# Patient Record
Sex: MALE | Race: WHITE | Employment: OTHER | URBAN - METROPOLITAN AREA
[De-identification: names, ages, dates, MRNs, and addresses within clinical notes are randomized per-mention and may not be internally consistent; named-entity substitution may affect disease eponyms.]

---

## 2017-01-04 ENCOUNTER — OFFICE VISIT (OUTPATIENT)
Dept: FAMILY MEDICINE CLINIC | Facility: CLINIC | Age: 42
End: 2017-01-04

## 2017-01-04 VITALS
HEART RATE: 78 BPM | WEIGHT: 265 LBS | SYSTOLIC BLOOD PRESSURE: 134 MMHG | RESPIRATION RATE: 20 BRPM | TEMPERATURE: 97 F | BODY MASS INDEX: 37.1 KG/M2 | HEIGHT: 71 IN | DIASTOLIC BLOOD PRESSURE: 78 MMHG

## 2017-01-04 DIAGNOSIS — J01.10 ACUTE FRONTAL SINUSITIS, RECURRENCE NOT SPECIFIED: Primary | ICD-10-CM

## 2017-01-04 DIAGNOSIS — K21.9 GASTROESOPHAGEAL REFLUX DISEASE, ESOPHAGITIS PRESENCE NOT SPECIFIED: ICD-10-CM

## 2017-01-04 PROCEDURE — 99214 OFFICE O/P EST MOD 30 MIN: CPT | Performed by: FAMILY MEDICINE

## 2017-01-04 RX ORDER — ESOMEPRAZOLE MAGNESIUM 40 MG/1
40 CAPSULE, DELAYED RELEASE ORAL 2 TIMES DAILY
Qty: 180 CAPSULE | Refills: 1 | Status: SHIPPED | OUTPATIENT
Start: 2017-01-04 | End: 2017-06-08

## 2017-01-04 RX ORDER — AZITHROMYCIN 250 MG/1
TABLET, FILM COATED ORAL
Qty: 6 TABLET | Refills: 0 | Status: SHIPPED | OUTPATIENT
Start: 2017-01-04 | End: 2017-01-30

## 2017-01-04 NOTE — PROGRESS NOTES
CC:  Raul Donato is a 37year old male here for Patient presents with:  Weakness  Dizziness  Sore Throat  Nasal Congestion      HPI:     URI  -started 2-3 wks ago  -associated with weakness, sore throat, nasal congestion  -previous treatment: only to 2 (two) times daily.  Disp: 100 each Rfl: 2       Allergies:    Adhesive Tape (Sharon*    Other (See Comments)    Comment:blistering  Allegra [Fexofenadi*      Benadryl [Diphenhyd*        Comment:Hives/itchy/vomiting/dizzy  Bentyl [Dicyclomine]      Carafate L.Leg          Past Surgical History    TONSILLECTOMY      COLONOSCOPY  11/13    Comment poor prep    COLONOSCOPY  11/19/2013    Comment Procedure: COLONOSCOPY;  Surgeon: Rusty Vazquez MD;  Location:  ENDOSCOPY    REMOVAL OF TONSILS,13+ Y/O      IR Release 180 capsule 1      Sig: Take 1 capsule (40 mg total) by mouth 2 (two) times daily. azithromycin 250 MG Oral Tab 6 tablet 0      Sig: Take two tablets by mouth today, then one daily.          The patient indicates understanding of these issues a

## 2017-01-04 NOTE — PATIENT INSTRUCTIONS
-- start azithromycin as prescribed  --take with food  -- lots of fluids and rest  -- continue to try to cut back on smoking  -- followup in 1 month, sooner if needed

## 2017-01-15 ENCOUNTER — HOSPITAL ENCOUNTER (EMERGENCY)
Age: 42
Discharge: HOME | End: 2017-01-15
Attending: EMERGENCY MEDICINE
Payer: MEDICARE

## 2017-01-15 ENCOUNTER — APPOINTMENT (OUTPATIENT)
Dept: GENERAL RADIOLOGY | Age: 42
End: 2017-01-15
Attending: EMERGENCY MEDICINE
Payer: MEDICARE

## 2017-01-15 VITALS
RESPIRATION RATE: 18 BRPM | DIASTOLIC BLOOD PRESSURE: 80 MMHG | TEMPERATURE: 98 F | BODY MASS INDEX: 33.86 KG/M2 | OXYGEN SATURATION: 98 % | HEART RATE: 88 BPM | WEIGHT: 250 LBS | SYSTOLIC BLOOD PRESSURE: 148 MMHG | HEIGHT: 72 IN

## 2017-01-15 DIAGNOSIS — E86.0 DEHYDRATION: Primary | ICD-10-CM

## 2017-01-15 DIAGNOSIS — B34.9 VIRAL SYNDROME: ICD-10-CM

## 2017-01-15 LAB
ALBUMIN SERPL-MCNC: 4 G/DL (ref 3.5–4.8)
ALP LIVER SERPL-CCNC: 56 U/L (ref 45–117)
ALT SERPL-CCNC: 29 U/L (ref 17–63)
AST SERPL-CCNC: 19 U/L (ref 15–41)
BASOPHILS # BLD AUTO: 0.08 X10(3) UL (ref 0–0.1)
BASOPHILS NFR BLD AUTO: 0.8 %
BILIRUB SERPL-MCNC: 0.6 MG/DL (ref 0.1–2)
BUN BLD-MCNC: 10 MG/DL (ref 8–20)
CALCIUM BLD-MCNC: 8.5 MG/DL (ref 8.3–10.3)
CHLORIDE: 103 MMOL/L (ref 101–111)
CLARITY UR REFRACT.AUTO: CLEAR
CO2: 26 MMOL/L (ref 22–32)
COLOR UR AUTO: YELLOW
CREAT BLD-MCNC: 1 MG/DL (ref 0.7–1.3)
EOSINOPHIL # BLD AUTO: 0.05 X10(3) UL (ref 0–0.3)
EOSINOPHIL NFR BLD AUTO: 0.5 %
ERYTHROCYTE [DISTWIDTH] IN BLOOD BY AUTOMATED COUNT: 12.9 % (ref 11.5–16)
GLUCOSE BLD-MCNC: 188 MG/DL (ref 70–99)
GLUCOSE UR STRIP.AUTO-MCNC: 100 MG/DL
HCT VFR BLD AUTO: 43.3 % (ref 37–53)
HGB BLD-MCNC: 15.3 G/DL (ref 13–17)
IMMATURE GRANULOCYTE COUNT: 0.06 X10(3) UL (ref 0–1)
IMMATURE GRANULOCYTE RATIO %: 0.6 %
KETONES UR STRIP.AUTO-MCNC: 15 MG/DL
LEUKOCYTE ESTERASE UR QL STRIP.AUTO: NEGATIVE
LYMPHOCYTES # BLD AUTO: 2.11 X10(3) UL (ref 0.9–4)
LYMPHOCYTES NFR BLD AUTO: 20.2 %
M PROTEIN MFR SERPL ELPH: 6.9 G/DL (ref 6.1–8.3)
MCH RBC QN AUTO: 31.2 PG (ref 27–33.2)
MCHC RBC AUTO-ENTMCNC: 35.3 G/DL (ref 31–37)
MCV RBC AUTO: 88.4 FL (ref 80–99)
MONOCYTES # BLD AUTO: 1.18 X10(3) UL (ref 0.1–0.6)
MONOCYTES NFR BLD AUTO: 11.3 %
NEUTROPHIL ABS PRELIM: 6.99 X10 (3) UL (ref 1.3–6.7)
NEUTROPHILS # BLD AUTO: 6.99 X10(3) UL (ref 1.3–6.7)
NEUTROPHILS NFR BLD AUTO: 66.6 %
NITRITE UR QL STRIP.AUTO: NEGATIVE
PH UR STRIP.AUTO: 6 [PH] (ref 4.5–8)
PLATELET # BLD AUTO: 254 10(3)UL (ref 150–450)
POTASSIUM SERPL-SCNC: 4.2 MMOL/L (ref 3.6–5.1)
RBC # BLD AUTO: 4.9 X10(6)UL (ref 4.3–5.7)
RBC UR QL AUTO: NEGATIVE
RED CELL DISTRIBUTION WIDTH-SD: 41.6 FL (ref 35.1–46.3)
SODIUM SERPL-SCNC: 139 MMOL/L (ref 136–144)
SP GR UR STRIP.AUTO: >=1.03 (ref 1–1.03)
UROBILINOGEN UR STRIP.AUTO-MCNC: 1 MG/DL
WBC # BLD AUTO: 10.5 X10(3) UL (ref 4–13)

## 2017-01-15 PROCEDURE — 80053 COMPREHEN METABOLIC PANEL: CPT | Performed by: EMERGENCY MEDICINE

## 2017-01-15 PROCEDURE — 81001 URINALYSIS AUTO W/SCOPE: CPT | Performed by: EMERGENCY MEDICINE

## 2017-01-15 PROCEDURE — 99285 EMERGENCY DEPT VISIT HI MDM: CPT

## 2017-01-15 PROCEDURE — 85025 COMPLETE CBC W/AUTO DIFF WBC: CPT | Performed by: EMERGENCY MEDICINE

## 2017-01-15 PROCEDURE — 96361 HYDRATE IV INFUSION ADD-ON: CPT

## 2017-01-15 PROCEDURE — 93010 ELECTROCARDIOGRAM REPORT: CPT

## 2017-01-15 PROCEDURE — 71020 XR CHEST PA + LAT CHEST (CPT=71020): CPT

## 2017-01-15 PROCEDURE — 96360 HYDRATION IV INFUSION INIT: CPT

## 2017-01-15 PROCEDURE — 93005 ELECTROCARDIOGRAM TRACING: CPT

## 2017-01-15 PROCEDURE — 99284 EMERGENCY DEPT VISIT MOD MDM: CPT

## 2017-01-15 RX ORDER — IPRATROPIUM BROMIDE AND ALBUTEROL SULFATE 2.5; .5 MG/3ML; MG/3ML
3 SOLUTION RESPIRATORY (INHALATION) ONCE
Status: COMPLETED | OUTPATIENT
Start: 2017-01-15 | End: 2017-01-15

## 2017-01-15 NOTE — ED PROVIDER NOTES
Patient Seen in: THE [facility] Emergency Department In [place]    History   Patient presents with:  Dizziness (neurologic)  Abdomen/Flank Pain (GI/)    Stated Complaint: dizziness, abdominal pain    HPI    Patient presents with weakness and dizziness.   The FILTER PLACEMENT      GASTRO - DMG  4/14    Comment normal    GASTRO - DMG  5/14    Comment large amount of food in stomach    OTHER      Comment open knee surgery left knee    ANESTH,SURGERY SHOULDER BONE,OPEN      CATH DRUG ELUTING STENT      Comment lef Status: Former User                         Types: Snuff    Alcohol Use: Yes           0.0 oz/week       0 Standard drinks or equivalent per week       Comment: rare      Review of Systems    Positive for stated complaint: dizziness, abdominal pain  Other DIFFERENTIAL - Abnormal; Notable for the following:     Neutrophil Absolute Prelim 6.99 (*)     Neutrophil Absolute 6.99 (*)     Monocyte Absolute 1.18 (*)     All other components within normal limits   CBC WITH DIFFERENTIAL WITH PLATELET    Narrative: 1745)     Patient was given a total of 2 L of normal saline. He did appear to be very dehydrated and his urine was quite concentrated. Feels somewhat better but is still weak. He was given a nebulizer treatment without much change in his symptoms.   He h

## 2017-01-15 NOTE — ED INITIAL ASSESSMENT (HPI)
Pt with dizziness for three weeks,states has been on two different antibiotics for uri's, chronic abdominal pain.

## 2017-01-17 ENCOUNTER — OFFICE VISIT (OUTPATIENT)
Dept: FAMILY MEDICINE CLINIC | Facility: CLINIC | Age: 42
End: 2017-01-17

## 2017-01-17 VITALS
HEART RATE: 66 BPM | HEIGHT: 71 IN | RESPIRATION RATE: 18 BRPM | DIASTOLIC BLOOD PRESSURE: 84 MMHG | WEIGHT: 263 LBS | BODY MASS INDEX: 36.82 KG/M2 | SYSTOLIC BLOOD PRESSURE: 132 MMHG

## 2017-01-17 DIAGNOSIS — R19.7 DIARRHEA OF PRESUMED INFECTIOUS ORIGIN: Primary | ICD-10-CM

## 2017-01-17 DIAGNOSIS — E86.0 DEHYDRATION: ICD-10-CM

## 2017-01-17 LAB
ATRIAL RATE: 73 BPM
P AXIS: 11 DEGREES
P-R INTERVAL: 196 MS
Q-T INTERVAL: 402 MS
QRS DURATION: 96 MS
QTC CALCULATION (BEZET): 442 MS
R AXIS: 31 DEGREES
T AXIS: 7 DEGREES
VENTRICULAR RATE: 73 BPM

## 2017-01-17 PROCEDURE — 99214 OFFICE O/P EST MOD 30 MIN: CPT | Performed by: FAMILY MEDICINE

## 2017-01-17 NOTE — PATIENT INSTRUCTIONS
-- start yogurt 1-2x/day  -- keep up with fluids as much as possible  -- natural gregoria can help with nausea  -- muscle cramps should improve with better hydration  -- if diarrhea not improving in next couple of days, bring in stool sample

## 2017-01-17 NOTE — PROGRESS NOTES
CC:  Chema Harper is a 37year old male here for Patient presents with:  ER F/U: 01/15/2017 the patient was in the ER for dehydration and viral syndrome   Diarrhea      HPI:     Here for ER followup - was treated for dehydration with IV fluids in sett tamsulosin HCl (FLOMAX) 0.4 MG Oral Cap nightly. Disp:  Rfl: 1   Albuterol Sulfate HFA (VENTOLIN) 108 (90 BASE) MCG/ACT Inhalation Aero Soln Inhale 2 puffs into the lungs every 6 (six) hours as needed for Wheezing.  Disp: 1 Inhaler Rfl: 1   Lancets Does Anesthesia complication      wakes up during surgery - x 2   • Back pain    • History of blood clots    • Other and unspecified hyperlipidemia    • Esophageal reflux    • Rheumatoid arthritis (HCC)    • DDD (degenerative disc disease), lumbar    • Deep vei for now  -gregoria prn nausea  -if diarrhea not resolving in next couple of days, return stool sample for c diff (kit given)  - followup if not improving or worsening  -otherwise, plan to followup in 1 month    Orders This Visit:    Orders Placed This Encoun

## 2017-01-19 ENCOUNTER — APPOINTMENT (OUTPATIENT)
Dept: LAB | Age: 42
End: 2017-01-19
Attending: FAMILY MEDICINE
Payer: MEDICARE

## 2017-01-19 DIAGNOSIS — R19.7 DIARRHEA OF PRESUMED INFECTIOUS ORIGIN: ICD-10-CM

## 2017-01-19 PROCEDURE — 87493 C DIFF AMPLIFIED PROBE: CPT

## 2017-01-20 ENCOUNTER — TELEPHONE (OUTPATIENT)
Dept: FAMILY MEDICINE CLINIC | Facility: CLINIC | Age: 42
End: 2017-01-20

## 2017-01-20 NOTE — TELEPHONE ENCOUNTER
----- Message from Lakia Mi MD sent at 1/20/2017 11:58 AM CST -----  c diff negative - keep up with fluids, diarrhea should resolve in next couple of days  -followup early next week if issues

## 2017-01-20 NOTE — PROGRESS NOTES
Quick Note:    c diff negative - keep up with fluids, diarrhea should resolve in next couple of days  -followup early next week if issues  ______

## 2017-01-20 NOTE — TELEPHONE ENCOUNTER
lmom for pt with results/instructions. Asked pt after reviewing message to call office with any questions.

## 2017-01-20 NOTE — TELEPHONE ENCOUNTER
Pt returned call. He didn't listen to his message. Ok to relay per Rahul. Pt understood to call back next week if he's not better.

## 2017-01-30 ENCOUNTER — OFFICE VISIT (OUTPATIENT)
Dept: FAMILY MEDICINE CLINIC | Facility: CLINIC | Age: 42
End: 2017-01-30

## 2017-01-30 VITALS
HEIGHT: 71 IN | TEMPERATURE: 97 F | SYSTOLIC BLOOD PRESSURE: 136 MMHG | WEIGHT: 266 LBS | DIASTOLIC BLOOD PRESSURE: 72 MMHG | RESPIRATION RATE: 18 BRPM | BODY MASS INDEX: 37.24 KG/M2 | HEART RATE: 78 BPM

## 2017-01-30 DIAGNOSIS — E11.65 TYPE 2 DIABETES MELLITUS WITH HYPERGLYCEMIA, WITH LONG-TERM CURRENT USE OF INSULIN (HCC): ICD-10-CM

## 2017-01-30 DIAGNOSIS — R53.1 GENERALIZED WEAKNESS: Primary | ICD-10-CM

## 2017-01-30 DIAGNOSIS — R63.0 DECREASED APPETITE: ICD-10-CM

## 2017-01-30 DIAGNOSIS — Z79.4 TYPE 2 DIABETES MELLITUS WITH HYPERGLYCEMIA, WITH LONG-TERM CURRENT USE OF INSULIN (HCC): ICD-10-CM

## 2017-01-30 PROCEDURE — 99214 OFFICE O/P EST MOD 30 MIN: CPT | Performed by: FAMILY MEDICINE

## 2017-01-30 NOTE — PROGRESS NOTES
Lisa Hannon is a 37year old male here for Patient presents with:  Lightheadedness  Weakness  Swelling: hands, ankles, neck  Cough      HPI:     Generalized weakness  -continues to feel weak, lightheaded, decreased appetite  -about 2 wks ago, he decr UPPER GI ENDO NO BARRETTS  5/15    Comment food in stomach    VASCULAR SURGERY      OTHER SURGICAL HISTORY      Comment Head surgery/birthmark    OTHER SURGICAL HISTORY      Comment Knee/scope    OTHER SURGICAL HISTORY      Comment Neck surgery.  Lymph node Lancets Does not apply Misc Inject 1 each into the skin 2 (two) times daily.  Disp: 100 each Rfl: 2       Allergies:    Adhesive Tape (Sharon*    Other (See Comments)    Comment:blistering  Allegra [Fexofenadi*      Benadryl [Diphenhyd*        Comment:Hives get him through until next refill          1/30/2017  Vivian Toth MD

## 2017-01-30 NOTE — PATIENT INSTRUCTIONS
-- increase nexium to 2x/day  -- ok for robitussin as needed  -- honey can help with congestion and cough as well  -- consider starting job (will have to push through the lack of energy for first couple of weeks, but your body will get better)  -- restart

## 2017-02-04 ENCOUNTER — APPOINTMENT (OUTPATIENT)
Dept: GENERAL RADIOLOGY | Age: 42
End: 2017-02-04
Attending: EMERGENCY MEDICINE
Payer: MEDICARE

## 2017-02-04 ENCOUNTER — HOSPITAL ENCOUNTER (EMERGENCY)
Age: 42
Discharge: HOME | End: 2017-02-04
Attending: EMERGENCY MEDICINE
Payer: MEDICARE

## 2017-02-04 VITALS
TEMPERATURE: 99 F | WEIGHT: 250 LBS | OXYGEN SATURATION: 96 % | SYSTOLIC BLOOD PRESSURE: 137 MMHG | DIASTOLIC BLOOD PRESSURE: 66 MMHG | HEART RATE: 64 BPM | RESPIRATION RATE: 14 BRPM | HEIGHT: 72 IN | BODY MASS INDEX: 33.86 KG/M2

## 2017-02-04 DIAGNOSIS — J02.9 ACUTE VIRAL PHARYNGITIS: Primary | ICD-10-CM

## 2017-02-04 DIAGNOSIS — J06.9 UPPER RESPIRATORY TRACT INFECTION, UNSPECIFIED TYPE: ICD-10-CM

## 2017-02-04 DIAGNOSIS — S40.012A CONTUSION OF LEFT SHOULDER, INITIAL ENCOUNTER: ICD-10-CM

## 2017-02-04 LAB
ALBUMIN SERPL-MCNC: 3.8 G/DL (ref 3.5–4.8)
ALP LIVER SERPL-CCNC: 56 U/L (ref 45–117)
ALT SERPL-CCNC: 32 U/L (ref 17–63)
AST SERPL-CCNC: 17 U/L (ref 15–41)
BASOPHILS # BLD AUTO: 0.07 X10(3) UL (ref 0–0.1)
BASOPHILS NFR BLD AUTO: 0.8 %
BILIRUB SERPL-MCNC: 0.5 MG/DL (ref 0.1–2)
BUN BLD-MCNC: 9 MG/DL (ref 8–20)
CALCIUM BLD-MCNC: 8.7 MG/DL (ref 8.3–10.3)
CHLORIDE: 104 MMOL/L (ref 101–111)
CO2: 24 MMOL/L (ref 22–32)
CREAT BLD-MCNC: 0.95 MG/DL (ref 0.7–1.3)
EOSINOPHIL # BLD AUTO: 0.09 X10(3) UL (ref 0–0.3)
EOSINOPHIL NFR BLD AUTO: 1 %
ERYTHROCYTE [DISTWIDTH] IN BLOOD BY AUTOMATED COUNT: 12.9 % (ref 11.5–16)
GLUCOSE BLD-MCNC: 199 MG/DL (ref 70–99)
HCT VFR BLD AUTO: 40.5 % (ref 37–53)
HGB BLD-MCNC: 14.8 G/DL (ref 13–17)
IMMATURE GRANULOCYTE COUNT: 0.06 X10(3) UL (ref 0–1)
IMMATURE GRANULOCYTE RATIO %: 0.7 %
LYMPHOCYTES # BLD AUTO: 2.28 X10(3) UL (ref 0.9–4)
LYMPHOCYTES NFR BLD AUTO: 26.5 %
M PROTEIN MFR SERPL ELPH: 6.5 G/DL (ref 6.1–8.3)
MCH RBC QN AUTO: 31.9 PG (ref 27–33.2)
MCHC RBC AUTO-ENTMCNC: 36.5 G/DL (ref 31–37)
MCV RBC AUTO: 87.3 FL (ref 80–99)
MONOCYTES # BLD AUTO: 0.85 X10(3) UL (ref 0.1–0.6)
MONOCYTES NFR BLD AUTO: 9.9 %
MONOSCREEN: NEGATIVE
NEUTROPHIL ABS PRELIM: 5.27 X10 (3) UL (ref 1.3–6.7)
NEUTROPHILS # BLD AUTO: 5.27 X10(3) UL (ref 1.3–6.7)
NEUTROPHILS NFR BLD AUTO: 61.1 %
PLATELET # BLD AUTO: 197 10(3)UL (ref 150–450)
POTASSIUM SERPL-SCNC: 3.7 MMOL/L (ref 3.6–5.1)
RBC # BLD AUTO: 4.64 X10(6)UL (ref 4.3–5.7)
RED CELL DISTRIBUTION WIDTH-SD: 40.4 FL (ref 35.1–46.3)
SODIUM SERPL-SCNC: 139 MMOL/L (ref 136–144)
WBC # BLD AUTO: 8.6 X10(3) UL (ref 4–13)

## 2017-02-04 PROCEDURE — 87081 CULTURE SCREEN ONLY: CPT | Performed by: EMERGENCY MEDICINE

## 2017-02-04 PROCEDURE — 87430 STREP A AG IA: CPT | Performed by: EMERGENCY MEDICINE

## 2017-02-04 PROCEDURE — 73030 X-RAY EXAM OF SHOULDER: CPT

## 2017-02-04 PROCEDURE — 71020 XR CHEST PA + LAT CHEST (CPT=71020): CPT

## 2017-02-04 PROCEDURE — 96360 HYDRATION IV INFUSION INIT: CPT

## 2017-02-04 PROCEDURE — 99284 EMERGENCY DEPT VISIT MOD MDM: CPT

## 2017-02-04 PROCEDURE — 86403 PARTICLE AGGLUT ANTBDY SCRN: CPT | Performed by: EMERGENCY MEDICINE

## 2017-02-04 PROCEDURE — 80053 COMPREHEN METABOLIC PANEL: CPT | Performed by: EMERGENCY MEDICINE

## 2017-02-04 PROCEDURE — 85025 COMPLETE CBC W/AUTO DIFF WBC: CPT | Performed by: EMERGENCY MEDICINE

## 2017-02-04 RX ORDER — SODIUM CHLORIDE 9 MG/ML
INJECTION, SOLUTION INTRAVENOUS ONCE
Status: COMPLETED | OUTPATIENT
Start: 2017-02-04 | End: 2017-02-04

## 2017-02-05 NOTE — ED PROVIDER NOTES
Patient Seen in: THE [facility] Emergency Department In [place]    History   Patient presents with:  Sore Throat    Stated Complaint: sore throat    HPI    Patient is a 21-year-old male who presents emergency room with a history of multiple complaints.   The p • Rheumatoid arthritis ([facility] [address]    • DDD (degenerative disc disease), lumbar    • Deep vein thrombosis ([facility] [address] \"Years ago\"     L.Leg           Past Surgical History    TONSILLECTOMY      COLONOSCOPY  11/13    Comment poor prep    COLONOSCOPY  11/19/2013 Onset   • Diabetes Mother      DM   • Other[other] [OTHER] Mother    • Heart Disorder Father    • other[other] [OTHER] Brother      back problems    • Breast Cancer Maternal Grandmother          Smoking Status: Current Every Day Smoker        Packs/Day: 0. no guarding, no rebound, no mass, and no organomegaly appreciated. There is normoactive bowel sounds. There is no hernia. EXTREMITIES: There is no cyanosis, clubbing, or edema appreciated. Pulses are 2+ and equal in all 4 extremities.   There is mild tende no other evidence of abnormalities noted. Liver function tests are unremarkable. Patient's Monospot is negative. Patient strep test negative. Patient was given a liter of IV fluid in the emergency room.   Patient had no further new complaints throughout

## 2017-02-20 ENCOUNTER — APPOINTMENT (OUTPATIENT)
Dept: LAB | Age: 42
End: 2017-02-20
Attending: FAMILY MEDICINE
Payer: MEDICARE

## 2017-02-20 ENCOUNTER — OFFICE VISIT (OUTPATIENT)
Dept: FAMILY MEDICINE CLINIC | Facility: CLINIC | Age: 42
End: 2017-02-20

## 2017-02-20 VITALS
DIASTOLIC BLOOD PRESSURE: 82 MMHG | OXYGEN SATURATION: 97 % | RESPIRATION RATE: 22 BRPM | WEIGHT: 270 LBS | HEART RATE: 75 BPM | BODY MASS INDEX: 37.8 KG/M2 | HEIGHT: 71 IN | SYSTOLIC BLOOD PRESSURE: 120 MMHG | TEMPERATURE: 97 F

## 2017-02-20 DIAGNOSIS — G89.29 CHRONIC ABDOMINAL PAIN: ICD-10-CM

## 2017-02-20 DIAGNOSIS — K59.09 OTHER CONSTIPATION: ICD-10-CM

## 2017-02-20 DIAGNOSIS — J00 ACUTE NASOPHARYNGITIS: ICD-10-CM

## 2017-02-20 DIAGNOSIS — Z79.4 TYPE 2 DIABETES MELLITUS WITH HYPERGLYCEMIA, WITH LONG-TERM CURRENT USE OF INSULIN (HCC): ICD-10-CM

## 2017-02-20 DIAGNOSIS — R10.9 CHRONIC ABDOMINAL PAIN: ICD-10-CM

## 2017-02-20 DIAGNOSIS — E11.65 TYPE 2 DIABETES MELLITUS WITH HYPERGLYCEMIA, WITH LONG-TERM CURRENT USE OF INSULIN (HCC): ICD-10-CM

## 2017-02-20 DIAGNOSIS — Z79.4 TYPE 2 DIABETES MELLITUS WITH HYPERGLYCEMIA, WITH LONG-TERM CURRENT USE OF INSULIN (HCC): Primary | ICD-10-CM

## 2017-02-20 DIAGNOSIS — E11.65 TYPE 2 DIABETES MELLITUS WITH HYPERGLYCEMIA, WITH LONG-TERM CURRENT USE OF INSULIN (HCC): Primary | ICD-10-CM

## 2017-02-20 DIAGNOSIS — K21.9 GASTROESOPHAGEAL REFLUX DISEASE, ESOPHAGITIS PRESENCE NOT SPECIFIED: ICD-10-CM

## 2017-02-20 DIAGNOSIS — J02.9 SORE THROAT: ICD-10-CM

## 2017-02-20 PROCEDURE — 80053 COMPREHEN METABOLIC PANEL: CPT

## 2017-02-20 PROCEDURE — 83036 HEMOGLOBIN GLYCOSYLATED A1C: CPT

## 2017-02-20 PROCEDURE — 36415 COLL VENOUS BLD VENIPUNCTURE: CPT

## 2017-02-20 PROCEDURE — 99214 OFFICE O/P EST MOD 30 MIN: CPT | Performed by: FAMILY MEDICINE

## 2017-02-20 NOTE — PROGRESS NOTES
Fartun Briones is a 37year old male here for Patient presents with:  Sore Throat: x 1 week  Abdominal Pain: x 1 week related to GERD  Shortness Of Breath      HPI:     Abdominal Pain  -continues to have chronic abdominal pain with the feeling of acid i food in stomach    VASCULAR SURGERY      OTHER SURGICAL HISTORY      Comment Head surgery/birthmark    OTHER SURGICAL HISTORY      Comment Knee/scope    OTHER SURGICAL HISTORY      Comment Neck surgery.  Lymph node removed in his 20's       Family History into the skin 2 (two) times daily. Disp: 100 each Rfl: 2   Albuterol Sulfate HFA (VENTOLIN) 108 (90 BASE) MCG/ACT Inhalation Aero Soln Inhale 2 puffs into the lungs every 6 (six) hours as needed for Wheezing.  Disp: 1 Inhaler Rfl: 1       Allergies:    Adhe of steroids  -f/u in 1 wk to reassess    DM  -recheck labs   -patient  Declines urine testing today       The patient is asked to return in 1 wk.     Orders This Visit:    Orders Placed This Encounter  Hemoglobin A1C [E]  Comp Metabolic Panel (14) [E]    2/

## 2017-02-20 NOTE — PATIENT INSTRUCTIONS
-- go to lab for bloodwork  -- humidifer, fluids, rest, warm water with honey for throat  -- try to cut back on smoking  -- followup next week  -- start linzess once daily and if no effect, can increase to 2 tabs at the same time daily  -- continue to push

## 2017-02-21 LAB
ALBUMIN SERPL-MCNC: 3.8 G/DL (ref 3.5–4.8)
ALP LIVER SERPL-CCNC: 69 U/L (ref 45–117)
ALT SERPL-CCNC: 34 U/L (ref 17–63)
AST SERPL-CCNC: 22 U/L (ref 15–41)
BILIRUB SERPL-MCNC: 0.6 MG/DL (ref 0.1–2)
BUN BLD-MCNC: 15 MG/DL (ref 8–20)
CALCIUM BLD-MCNC: 9.3 MG/DL (ref 8.3–10.3)
CHLORIDE: 101 MMOL/L (ref 101–111)
CO2: 27 MMOL/L (ref 22–32)
CREAT BLD-MCNC: 0.99 MG/DL (ref 0.7–1.3)
EST. AVERAGE GLUCOSE BLD GHB EST-MCNC: 197 MG/DL (ref 68–126)
GLUCOSE BLD-MCNC: 260 MG/DL (ref 70–99)
HBA1C MFR BLD HPLC: 8.5 % (ref ?–5.7)
M PROTEIN MFR SERPL ELPH: 6.3 G/DL (ref 6.1–8.3)
POTASSIUM SERPL-SCNC: 4.2 MMOL/L (ref 3.6–5.1)
SODIUM SERPL-SCNC: 137 MMOL/L (ref 136–144)

## 2017-02-27 ENCOUNTER — TELEPHONE (OUTPATIENT)
Dept: FAMILY MEDICINE CLINIC | Facility: CLINIC | Age: 42
End: 2017-02-27

## 2017-02-27 RX ORDER — PREDNISONE 5 MG/ML
20 SOLUTION ORAL DAILY
Qty: 100 ML | Refills: 0 | Status: SHIPPED | OUTPATIENT
Start: 2017-02-27 | End: 2017-03-04

## 2017-02-27 NOTE — TELEPHONE ENCOUNTER
Patient called  -not feeling better  -continues to have throat pain and coughing  -has been on several rounds of antibiotics  -will try liquid prednisone 20mg daily x 5 days  -sugars high - will also increase metformin to 2500mg daily  -f/u later this week

## 2017-03-07 ENCOUNTER — OFFICE VISIT (OUTPATIENT)
Dept: FAMILY MEDICINE CLINIC | Facility: CLINIC | Age: 42
End: 2017-03-07

## 2017-03-07 VITALS
RESPIRATION RATE: 18 BRPM | BODY MASS INDEX: 37.52 KG/M2 | SYSTOLIC BLOOD PRESSURE: 144 MMHG | WEIGHT: 268 LBS | DIASTOLIC BLOOD PRESSURE: 90 MMHG | HEART RATE: 88 BPM | HEIGHT: 71 IN | TEMPERATURE: 98 F

## 2017-03-07 DIAGNOSIS — Z79.4 TYPE 2 DIABETES MELLITUS WITH OTHER ORAL COMPLICATION, WITH LONG-TERM CURRENT USE OF INSULIN (HCC): Primary | ICD-10-CM

## 2017-03-07 DIAGNOSIS — E11.638 TYPE 2 DIABETES MELLITUS WITH OTHER ORAL COMPLICATION, WITH LONG-TERM CURRENT USE OF INSULIN (HCC): Primary | ICD-10-CM

## 2017-03-07 DIAGNOSIS — K21.9 GASTROESOPHAGEAL REFLUX DISEASE, ESOPHAGITIS PRESENCE NOT SPECIFIED: ICD-10-CM

## 2017-03-07 DIAGNOSIS — R53.1 WEAKNESS: ICD-10-CM

## 2017-03-07 DIAGNOSIS — F98.8 ATTENTION DEFICIT DISORDER: ICD-10-CM

## 2017-03-07 DIAGNOSIS — R49.0 HOARSENESS OF VOICE: ICD-10-CM

## 2017-03-07 PROCEDURE — 99214 OFFICE O/P EST MOD 30 MIN: CPT | Performed by: FAMILY MEDICINE

## 2017-03-07 NOTE — PATIENT INSTRUCTIONS
-- call ENT office at [phone] for hoarse voice, gerd, and swallowing problem - you can ask to see someone other than Dr. Vernon Garcia   -- decrease simvastatin to 20mg (start taking 1/2 tablet)  -- continue lantus at 35 units daily  -- start tradjenta 5mg

## 2017-03-07 NOTE — PROGRESS NOTES
Raul Donato is a 37year old male here for Patient presents with:   Follow - Up  Weakness  Dizziness  Shortness Of Breath  Cough      HPI:     DM  -sugars worse  -A1c worse  -patient wants to try stronger form of insulin  -although recently decreased BONE,OPEN      CATH DRUG ELUTING STENT      Comment left leg    UPPER GI ENDO NO BARRETTS  5/15    Comment food in stomach    VASCULAR SURGERY      OTHER SURGICAL HISTORY      Comment Head surgery/birthmark    OTHER SURGICAL HISTORY      Comment Knee/scope into the lungs every 6 (six) hours as needed for Wheezing. Disp: 1 Inhaler Rfl: 1   Lancets Does not apply Misc Inject 1 each into the skin 2 (two) times daily.  Disp: 100 each Rfl: 2   CloNIDine HCl 0.1 MG Oral Tab Take 1 tablet (0.1 mg total) by mouth 2 ( voice  -symptoms worsening, despite relatively benign GI exam  -will have patient followup with ENT again to recheck throat for ?  Persistent signs of gerd  -c/w ppi bid  -f/u after ENT    Weakness  -start trial of simvastatin 20mg to see if this is contrib

## 2017-03-15 ENCOUNTER — OFFICE VISIT (OUTPATIENT)
Dept: SURGERY | Facility: CLINIC | Age: 42
End: 2017-03-15

## 2017-03-15 VITALS — HEART RATE: 68 BPM | WEIGHT: 268 LBS | BODY MASS INDEX: 36.3 KG/M2 | HEIGHT: 72 IN | TEMPERATURE: 99 F

## 2017-03-15 DIAGNOSIS — K21.9 GASTROESOPHAGEAL REFLUX DISEASE, ESOPHAGITIS PRESENCE NOT SPECIFIED: Primary | ICD-10-CM

## 2017-03-15 PROCEDURE — 99213 OFFICE O/P EST LOW 20 MIN: CPT | Performed by: SURGERY

## 2017-03-15 NOTE — H&P
New Patient Visit Note       Active Problems      1.  Gastroesophageal reflux disease, esophagitis presence not specified        Chief Complaint   Patient presents with:  Gastro-esophageal Reflux: New patient referred by Dr. Augusto Bridges for Wilber taylor to his gastroenterologist for further testing, he declines stating that neither the [address] [place] [address] doctor nor the GI doctor in [facility] are suitable for him as they have in his opinion not solve his problem.   He wishes a referral to a different GI d 11/13    Comment poor prep    COLONOSCOPY  11/19/2013    Comment Procedure: COLONOSCOPY;  Surgeon: Gildardo Richardson MD;  Location:  ENDOSCOPY    REMOVAL OF TONSILS,13+ Y/O      IR IVC FILTER PLACEMENT      GASTRO - DMG  4/14    Comment normal    GASTR and 1 tablet qPM WITH MEALS Disp: 225 tablet Rfl: 1   TraZODone HCl 150 MG Oral Tab TAKE 3 TABLETS(450 MG) BY MOUTH EVERY NIGHT Disp: 90 tablet Rfl: 3   alprazolam 2 MG Oral Tab TAKE 1 TABLET BY MOUTH THREE TIMES DAILY AS NEEDED Disp: 90 tablet Rfl: 3   Cl urinating. Musculoskeletal: Negative for myalgias and arthralgias. Skin: Negative for color change and rash. Neurological: Positive for dizziness, weakness and light-headedness. Negative for tremors and syncope.    Hematological: Negative for adenopat studies, pH/Bravo testing and possibly gastric emptying studies should be completed to determine his suitability for fundoplication.   · I have advised the patient to seek input from gastroenterology; I have referred the patient to a different gastroenterol

## 2017-03-22 ENCOUNTER — TELEPHONE (OUTPATIENT)
Dept: FAMILY MEDICINE CLINIC | Facility: CLINIC | Age: 42
End: 2017-03-22

## 2017-03-22 NOTE — TELEPHONE ENCOUNTER
The patient is calling to let Dr. Jessica Wilder know that he is bloated and constipated. He also complains of dysuria, dark urine, and low back pain. The patient states that the  Michaelle is not working, and his blood sugar was 300 fasting.  He also wants Dr. Jessica Wilder

## 2017-03-24 ENCOUNTER — OFFICE VISIT (OUTPATIENT)
Dept: FAMILY MEDICINE CLINIC | Facility: CLINIC | Age: 42
End: 2017-03-24

## 2017-03-24 VITALS
SYSTOLIC BLOOD PRESSURE: 132 MMHG | TEMPERATURE: 98 F | HEART RATE: 98 BPM | DIASTOLIC BLOOD PRESSURE: 74 MMHG | RESPIRATION RATE: 18 BRPM | HEIGHT: 71 IN

## 2017-03-24 DIAGNOSIS — Z01.818 PREOP EXAMINATION: Primary | ICD-10-CM

## 2017-03-24 DIAGNOSIS — I73.9 PVD (PERIPHERAL VASCULAR DISEASE) (HCC): ICD-10-CM

## 2017-03-24 DIAGNOSIS — F17.200 TOBACCO DEPENDENCE: ICD-10-CM

## 2017-03-24 DIAGNOSIS — E11.638 TYPE 2 DIABETES MELLITUS WITH OTHER ORAL COMPLICATION, WITH LONG-TERM CURRENT USE OF INSULIN (HCC): ICD-10-CM

## 2017-03-24 DIAGNOSIS — I70.219 ATHEROSCLEROSIS OF NATIVE ARTERY OF EXTREMITY WITH INTERMITTENT CLAUDICATION, UNSPECIFIED EXTREMITY (HCC): ICD-10-CM

## 2017-03-24 DIAGNOSIS — K21.9 GASTROESOPHAGEAL REFLUX DISEASE, ESOPHAGITIS PRESENCE NOT SPECIFIED: ICD-10-CM

## 2017-03-24 DIAGNOSIS — Z79.4 TYPE 2 DIABETES MELLITUS WITH OTHER ORAL COMPLICATION, WITH LONG-TERM CURRENT USE OF INSULIN (HCC): ICD-10-CM

## 2017-03-24 DIAGNOSIS — E78.2 MIXED HYPERLIPIDEMIA: ICD-10-CM

## 2017-03-24 PROCEDURE — 93000 ELECTROCARDIOGRAM COMPLETE: CPT | Performed by: FAMILY MEDICINE

## 2017-03-24 PROCEDURE — 99215 OFFICE O/P EST HI 40 MIN: CPT | Performed by: FAMILY MEDICINE

## 2017-03-24 NOTE — PROGRESS NOTES
PREOPERATIVE HISTORY AND PHYSICAL     Pre-op clearance requested by:  Dr. Edel Barber  Proposed surgery: EGD with 48h bravo  Date of Procedure: 3/29/17  Location: Hospital/[facility]     HPI (Indications. symptoms): Chema Harper is a 37year old male here for prep   • Colonoscopy  11/19/2013     Procedure: COLONOSCOPY;  Surgeon: Romel Jordan MD;  Location: [facility] ENDOSCOPY   • Removal of tonsils,13+ y/o     • Ir ivc filter placement     • Gastro - dmg  4/14     normal   • Gastro - dmg  5/14     large amount DIRECTED ONCE DAILY Disp: 100 each Rfl: 1   tamsulosin HCl (FLOMAX) 0.4 MG Oral Cap nightly.    Disp:  Rfl: 1   Albuterol Sulfate HFA (VENTOLIN) 108 (90 BASE) MCG/ACT Inhalation Aero Soln Inhale 2 puffs into the lungs every 6 (six) hours as needed for [place] states \"messes with depression\"  Ondansetron             Anaphylaxis, Hives  Other                   Hives, Itching, Nausea and vomiting    Comment:Probiotic ZXL 3  Prilosec [Omeprazol*    Hives, Itching, Nausea and vomiting  Prochlorperazine        Unkn

## 2017-03-24 NOTE — PATIENT INSTRUCTIONS
--increase lantus to 40 units every day  -- continue metformin at increased dose  -- continue tradjenta  --check fasting sugar every morning and record and touch base with me in 1 week  -- followup with GI as planned for procedure next week

## 2017-03-27 ENCOUNTER — TELEPHONE (OUTPATIENT)
Dept: FAMILY MEDICINE CLINIC | Facility: CLINIC | Age: 42
End: 2017-03-27

## 2017-03-27 NOTE — TELEPHONE ENCOUNTER
A copy of the patient's EKG was successfully faxed to Jas Geller from preadmission testing at [phone].  She was requesting a copy of the EKG because it was part of the patient's pre-op exam.

## 2017-03-28 ENCOUNTER — ANESTHESIA EVENT (OUTPATIENT)
Dept: ENDOSCOPY | Facility: HOSPITAL | Age: 42
End: 2017-03-28
Payer: MEDICARE

## 2017-03-29 ENCOUNTER — SURGERY (OUTPATIENT)
Age: 42
End: 2017-03-29

## 2017-03-29 ENCOUNTER — ANESTHESIA (OUTPATIENT)
Dept: ENDOSCOPY | Facility: HOSPITAL | Age: 42
End: 2017-03-29
Payer: MEDICARE

## 2017-03-29 ENCOUNTER — HOSPITAL ENCOUNTER (OUTPATIENT)
Facility: HOSPITAL | Age: 42
Setting detail: HOSPITAL OUTPATIENT SURGERY
Discharge: HOME | End: 2017-03-29
Attending: INTERNAL MEDICINE | Admitting: INTERNAL MEDICINE
Payer: MEDICARE

## 2017-03-29 VITALS
RESPIRATION RATE: 16 BRPM | DIASTOLIC BLOOD PRESSURE: 78 MMHG | WEIGHT: 268 LBS | SYSTOLIC BLOOD PRESSURE: 120 MMHG | HEIGHT: 72 IN | OXYGEN SATURATION: 97 % | HEART RATE: 58 BPM | BODY MASS INDEX: 36.3 KG/M2

## 2017-03-29 LAB — GLUCOSE BLD-MCNC: 232 MG/DL (ref 65–99)

## 2017-03-29 PROCEDURE — 0DJ08ZZ INSPECTION OF UPPER INTESTINAL TRACT, VIA NATURAL OR ARTIFICIAL OPENING ENDOSCOPIC: ICD-10-PCS | Performed by: INTERNAL MEDICINE

## 2017-03-29 PROCEDURE — 82962 GLUCOSE BLOOD TEST: CPT

## 2017-03-29 RX ORDER — SODIUM CHLORIDE, SODIUM LACTATE, POTASSIUM CHLORIDE, CALCIUM CHLORIDE 600; 310; 30; 20 MG/100ML; MG/100ML; MG/100ML; MG/100ML
INJECTION, SOLUTION INTRAVENOUS CONTINUOUS
Status: DISCONTINUED | OUTPATIENT
Start: 2017-03-29 | End: 2017-03-29

## 2017-03-29 NOTE — ANESTHESIA POSTPROCEDURE EVALUATION
[facility] Status:  Hospital [facility]patient [facility]   Age/Gender 37year old male MRN [id number]   Location [facility]. Attending [address], Vona Goodpasture,    Hosp Day # 0 PCP Remigio Tompkins MD       Anesthesia Post-op

## 2017-03-29 NOTE — INTERVAL H&P NOTE
Pre-op Diagnosis: GERD,LLQ    The above referenced H&P was reviewed by Suzan Reyes DO on 3/29/2017, the patient was examined and no significant changes have occurred in the patient's condition since the H&P was performed.   I discussed with the patient

## 2017-03-29 NOTE — ANESTHESIA PREPROCEDURE EVALUATION
PRE-OP EVALUATION    Patient Name: Chema Harper    Pre-op Diagnosis: GERD,LLQ    Procedure(s):  ESOPHAGOGASTRODUODENOSCOPY WITH 50 HOUR BRAVO        Surgeon(s) and Role:     * Wanda Knox, DO - Primary    Pre-op vitals reviewed.   Pulse: 65  Resp: Morphine; Motrin; Norco; Ondansetron; Zyrtec; Adhesive Tape (Rosins); Carafate; Ciprofloxacin; Clotrimazole; Codeine; Dexilant; Maalox Advanced; Magnesium Citrate; Other; Prilosec; Prochlorperazine; Protonix;  Reglan; Tramadol; Tylenol      Anesthesia Evalu WBC 8.6 02/04/2017   RBC 4.64 02/04/2017   HGB 14.8 02/04/2017   HCT 40.5 02/04/2017   MCV 87.3 02/04/2017   MCH 31.9 02/04/2017   MCHC 36.5 02/04/2017   RDW 12.9 02/04/2017   .0 02/04/2017       Lab Results  Component Value Date    02/20/20

## 2017-03-29 NOTE — OPERATIVE REPORT
EGD WITH pH [address] Patient Status:  Hospital Outpatient Surgery    10/28/1975 MRN [id number]   [facility] ENDOSCOPY Attending Luis Palomino DO   Hosp Day # 0 PCP Hilario Hanks MD into the esophagus without resistance. Once 34 cm from the incisors was reached, suction was initiated the catheter was held in place for 30 seconds. Next, the probe was released from the catheter and the catheter was removed from the patient.  Correct plac

## 2017-04-01 ENCOUNTER — HOSPITAL ENCOUNTER (EMERGENCY)
Age: 42
Discharge: HOME | End: 2017-04-01
Attending: EMERGENCY MEDICINE
Payer: MEDICARE

## 2017-04-01 VITALS
OXYGEN SATURATION: 96 % | SYSTOLIC BLOOD PRESSURE: 123 MMHG | RESPIRATION RATE: 16 BRPM | HEART RATE: 71 BPM | BODY MASS INDEX: 35.21 KG/M2 | HEIGHT: 72 IN | DIASTOLIC BLOOD PRESSURE: 74 MMHG | WEIGHT: 260 LBS | TEMPERATURE: 98 F

## 2017-04-01 DIAGNOSIS — J02.9 PHARYNGITIS, UNSPECIFIED ETIOLOGY: Primary | ICD-10-CM

## 2017-04-01 PROCEDURE — 99284 EMERGENCY DEPT VISIT MOD MDM: CPT

## 2017-04-01 PROCEDURE — 81003 URINALYSIS AUTO W/O SCOPE: CPT | Performed by: EMERGENCY MEDICINE

## 2017-04-01 PROCEDURE — 87430 STREP A AG IA: CPT | Performed by: EMERGENCY MEDICINE

## 2017-04-01 PROCEDURE — 87081 CULTURE SCREEN ONLY: CPT | Performed by: EMERGENCY MEDICINE

## 2017-04-01 PROCEDURE — 96374 THER/PROPH/DIAG INJ IV PUSH: CPT

## 2017-04-01 RX ORDER — HYDROMORPHONE HYDROCHLORIDE 1 MG/ML
1 INJECTION, SOLUTION INTRAMUSCULAR; INTRAVENOUS; SUBCUTANEOUS ONCE
Status: COMPLETED | OUTPATIENT
Start: 2017-04-01 | End: 2017-04-01

## 2017-04-01 RX ORDER — ONDANSETRON 2 MG/ML
4 INJECTION INTRAMUSCULAR; INTRAVENOUS ONCE
Status: DISCONTINUED | OUTPATIENT
Start: 2017-04-01 | End: 2017-04-01

## 2017-04-01 NOTE — ED INITIAL ASSESSMENT (HPI)
Sore throat, white spots per pt onset last noc. Pt had endoscopy procedure done on 3/29 for \"digestive issues and bad esophagus\" and has been not feeling well since procedure.

## 2017-04-01 NOTE — ED PROVIDER NOTES
Patient Seen in: [facility] In [place]    History   Patient presents with:  Sore Throat    Stated Complaint: sore throat    HPI    23-year-old male presents to the emergency department complaining of a sore throat and white spots on h OTHER      Comment open knee surgery left knee    ANESTH,SURGERY SHOULDER BONE,OPEN      CATH DRUG ELUTING STENT      Comment left leg    UPPER GI ENDO NO BARRETTS  5/15    Comment food in stomach    VASCULAR SURGERY      OTHER SURGICAL HISTORY      Com Odilon King Mother    • Heart Disorder Father    • other[other] [OTHER] Brother      back problems    • Breast Cancer Maternal Grandmother          Smoking Status: Current Every Day Smoker        Packs/Day: 0.50  Years: 15        Types: Cigarettes    Smokeless - Normal   GRP A STREP CULT, THROAT     Intravenous access was obtained and the patient was treated with IV fluids, analgesics. Rapid strep screen was negative.   Urinalysis revealed a specific gravity of 1015 which indicates that the patient has been adeq

## 2017-04-06 ENCOUNTER — TELEPHONE (OUTPATIENT)
Dept: FAMILY MEDICINE CLINIC | Facility: CLINIC | Age: 42
End: 2017-04-06

## 2017-04-06 NOTE — TELEPHONE ENCOUNTER
Pt states he took 145mg of Linzess 3 days ago and she is having severe stomach pains and he can't go to the bathroom.

## 2017-04-07 NOTE — TELEPHONE ENCOUNTER
S/w patient regarding Dr Faraz Rivera recommendations. Advised that the enema will help with BM which should then help alleviate some of the abdominal discomfort. Patient states that he is not able to take the gas-x.  He has used it in the past and he states it

## 2017-04-07 NOTE — TELEPHONE ENCOUNTER
If he is severely constipated, he should try a fleets enema which is available otc (he has intolerances to most other otc medications)  -he could try gas-x to help with the stomach pains

## 2017-04-11 ENCOUNTER — OFFICE VISIT (OUTPATIENT)
Dept: SURGERY | Facility: CLINIC | Age: 42
End: 2017-04-11

## 2017-04-11 VITALS
WEIGHT: 260 LBS | BODY MASS INDEX: 35.21 KG/M2 | HEIGHT: 72 IN | RESPIRATION RATE: 18 BRPM | TEMPERATURE: 97 F | DIASTOLIC BLOOD PRESSURE: 81 MMHG | HEART RATE: 66 BPM | SYSTOLIC BLOOD PRESSURE: 121 MMHG

## 2017-04-11 DIAGNOSIS — K21.9 GASTROESOPHAGEAL REFLUX DISEASE, ESOPHAGITIS PRESENCE NOT SPECIFIED: Primary | ICD-10-CM

## 2017-04-11 PROCEDURE — 99213 OFFICE O/P EST LOW 20 MIN: CPT | Performed by: SURGERY

## 2017-04-11 NOTE — H&P
New Patient Visit Note       Active Problems      1.  Gastroesophageal reflux disease, esophagitis presence not specified        Chief Complaint   Patient presents with:  Hernia: New/est. patient- hernia consultation and GERD is acting up. c/o severe abdomi (degenerative disc disease), lumbar    • Deep vein thrombosis ([facility] [address] \"Years ago\"     L.Leg   • Unspecified sleep apnea      not using a device   • High cholesterol    • High blood pressure      pt denies   • Hearing impairment      left ear hearing impair Use: No            Other Topics            Concern  Caffeine Concern        No  Exercise                No  Seat Belt               Yes    Social History Narrative    ABSOLUTELY NO REFILLS OF ADDER AL AND CLONAZEPAM PER MD.         Current Outpatient Presc been reviewed by me during today. Review of Systems   Constitutional: Negative for fever, chills, diaphoresis, fatigue and unexpected weight change. HENT: Negative for hearing loss, nosebleeds, sore throat and trouble swallowing.     Respiratory: Negativ

## 2017-04-14 ENCOUNTER — APPOINTMENT (OUTPATIENT)
Dept: CT IMAGING | Facility: HOSPITAL | Age: 42
End: 2017-04-14
Attending: EMERGENCY MEDICINE
Payer: MEDICARE

## 2017-04-14 ENCOUNTER — HOSPITAL ENCOUNTER (EMERGENCY)
Facility: HOSPITAL | Age: 42
Discharge: HOME | End: 2017-04-14
Attending: EMERGENCY MEDICINE
Payer: MEDICARE

## 2017-04-14 VITALS
DIASTOLIC BLOOD PRESSURE: 67 MMHG | OXYGEN SATURATION: 99 % | TEMPERATURE: 99 F | BODY MASS INDEX: 30.7 KG/M2 | HEART RATE: 70 BPM | HEIGHT: 77 IN | SYSTOLIC BLOOD PRESSURE: 119 MMHG | RESPIRATION RATE: 16 BRPM | WEIGHT: 260 LBS

## 2017-04-14 DIAGNOSIS — R10.9 ABDOMINAL PAIN OF UNKNOWN ETIOLOGY: Primary | ICD-10-CM

## 2017-04-14 PROCEDURE — 99284 EMERGENCY DEPT VISIT MOD MDM: CPT

## 2017-04-14 PROCEDURE — 74176 CT ABD & PELVIS W/O CONTRAST: CPT

## 2017-04-14 PROCEDURE — 96361 HYDRATE IV INFUSION ADD-ON: CPT

## 2017-04-14 PROCEDURE — 96360 HYDRATION IV INFUSION INIT: CPT

## 2017-04-14 PROCEDURE — 81003 URINALYSIS AUTO W/O SCOPE: CPT | Performed by: EMERGENCY MEDICINE

## 2017-04-14 PROCEDURE — 85025 COMPLETE CBC W/AUTO DIFF WBC: CPT | Performed by: EMERGENCY MEDICINE

## 2017-04-14 PROCEDURE — 83690 ASSAY OF LIPASE: CPT | Performed by: EMERGENCY MEDICINE

## 2017-04-14 PROCEDURE — 80053 COMPREHEN METABOLIC PANEL: CPT | Performed by: EMERGENCY MEDICINE

## 2017-04-14 RX ORDER — HYOSCYAMINE SULFATE 0.125 MG
125 TABLET ORAL EVERY 4 HOURS PRN
Qty: 12 TABLET | Refills: 0 | Status: SHIPPED | OUTPATIENT
Start: 2017-04-14 | End: 2017-07-18 | Stop reason: ALTCHOICE

## 2017-04-14 NOTE — ED PROVIDER NOTES
Patient Seen in: [facility] Emergency Department    History   Patient presents with:  Abdomen/Flank Pain (GI/)    Stated Complaint: ABD PAIN    HPI    81-year-old white male who presents the emergency room today for complaint of abdominal pain.   P Comment Procedure: COLONOSCOPY;  Surgeon: Jaja Flores MD;  Location:  ENDOSCOPY    REMOVAL OF TONSILS,13+ Y/O      IR IVC FILTER PLACEMENT      GASTRO - DMG  4/14    Comment normal    GASTRO - DMG  5/14    Comment large amount of food in stomach DAILY   tamsulosin HCl (FLOMAX) 0.4 MG Oral Cap,  nightly. Albuterol Sulfate HFA (VENTOLIN) 108 (90 BASE) MCG/ACT Inhalation Aero Soln,  Inhale 2 puffs into the lungs every 6 (six) hours as needed for Wheezing.    Lancets Does not apply Misc,  Inject 1 soft nondistended nontender to deep palpation there is no rebound or guarding noted no hepatosplenomegaly is noted. No masses are noted. No hernias are palpated. Extremity exam reveals no clubbing cyanosis or edema.   Patient has good radial pulses not  Degenerative change involves visualized lower thoracic spine. PELVIC ORGANS:  Normal.  No free fluid.    LUNG BASES:  No visible pulmonary or pleural disease.     Impression:     CONCLUSION:    1.  No obstructing renal or ureteral consultation appreciated

## 2017-04-14 NOTE — ED INITIAL ASSESSMENT (HPI)
Pt here with LLQ pain with radiation to epigastric area. And sore throat. Stating he is having difficulty urinating and defecating. Last urinated this morning last bm 0100. Pt stated he needs surgery for GERD.  Denies NVD

## 2017-04-19 ENCOUNTER — OFFICE VISIT (OUTPATIENT)
Dept: SURGERY | Facility: CLINIC | Age: 42
End: 2017-04-19

## 2017-04-19 VITALS
BODY MASS INDEX: 35.21 KG/M2 | TEMPERATURE: 98 F | HEIGHT: 72 IN | HEART RATE: 80 BPM | RESPIRATION RATE: 18 BRPM | WEIGHT: 260 LBS

## 2017-04-19 DIAGNOSIS — K21.9 GASTROESOPHAGEAL REFLUX DISEASE, ESOPHAGITIS PRESENCE NOT SPECIFIED: Primary | ICD-10-CM

## 2017-04-19 DIAGNOSIS — R10.9 INTRACTABLE ABDOMINAL PAIN: ICD-10-CM

## 2017-04-19 PROCEDURE — 99212 OFFICE O/P EST SF 10 MIN: CPT | Performed by: SURGERY

## 2017-04-19 NOTE — PROGRESS NOTES
Follow Up Visit Note       Active Problems      1. Gastroesophageal reflux disease, esophagitis presence not specified    2. Intractable abdominal pain          Chief Complaint   Patient presents with:  Gastro-esophageal Reflux: 1 week follow up--GERD.  [facility] stated as uncontrolled    • History of stomach ulcers    • Kidney stones    • Extrinsic asthma, unspecified      bronchial   • Bipolar 1 disorder (HCC)    • Rodney filter in place      LEFT LEG   • Cancer [facility])      NECK TUMOR   • Back pain    • Histor Education:                 Number of children:               Social History Main Topics    Smoking Status: Current Every Day Smoker        Packs/Day: 0.50  Years: 15        Types: Cigarettes    Smokeless Status: Former User                         Types:  Sydnie Moody tablet Rfl: 1   Insulin Pen Needle (BD PEN NEEDLE SHORT U/F) 31G X 8 MM Does not apply Misc USE AS DIRECTED ONCE DAILY Disp: 100 each Rfl: 1   tamsulosin HCl (FLOMAX) 0.4 MG Oral Cap nightly.    Disp:  Rfl: 1   Albuterol Sulfate HFA (VENTOLIN) 108 (90 BASE) heard.  Pulmonary/Chest: Effort normal. No accessory muscle usage. No apnea. No respiratory distress. He has no decreased breath sounds. He has no wheezes. He has no rhonchi. He has no rales. He exhibits no mass. Abdominal: Soft.  Normal appearance and mary abdominal pain. · Based on the patient's recent esophageal manometry and pH studies, he is not a suitable candidate for antireflux surgery at this time.   · The patient is advised to return to the care of his gastroenterologist for ongoing management is ca

## 2017-04-26 ENCOUNTER — OFFICE VISIT (OUTPATIENT)
Dept: FAMILY MEDICINE CLINIC | Facility: CLINIC | Age: 42
End: 2017-04-26

## 2017-04-26 VITALS
WEIGHT: 265 LBS | HEIGHT: 72 IN | DIASTOLIC BLOOD PRESSURE: 80 MMHG | SYSTOLIC BLOOD PRESSURE: 128 MMHG | RESPIRATION RATE: 18 BRPM | BODY MASS INDEX: 35.89 KG/M2 | HEART RATE: 102 BPM

## 2017-04-26 DIAGNOSIS — E11.638 TYPE 2 DIABETES MELLITUS WITH OTHER ORAL COMPLICATION, WITH LONG-TERM CURRENT USE OF INSULIN (HCC): ICD-10-CM

## 2017-04-26 DIAGNOSIS — R10.9 INTRACTABLE ABDOMINAL PAIN: Primary | ICD-10-CM

## 2017-04-26 DIAGNOSIS — K21.9 GASTROESOPHAGEAL REFLUX DISEASE, ESOPHAGITIS PRESENCE NOT SPECIFIED: ICD-10-CM

## 2017-04-26 DIAGNOSIS — R14.0 BLOATING: ICD-10-CM

## 2017-04-26 DIAGNOSIS — Z79.4 TYPE 2 DIABETES MELLITUS WITH OTHER ORAL COMPLICATION, WITH LONG-TERM CURRENT USE OF INSULIN (HCC): ICD-10-CM

## 2017-04-26 PROCEDURE — 99214 OFFICE O/P EST MOD 30 MIN: CPT | Performed by: FAMILY MEDICINE

## 2017-04-26 RX ORDER — SIMVASTATIN 20 MG
20 TABLET ORAL NIGHTLY
Qty: 90 TABLET | Refills: 2 | Status: SHIPPED | OUTPATIENT
Start: 2017-04-26 | End: 2017-05-31

## 2017-04-26 NOTE — PATIENT INSTRUCTIONS
-- call to schedule nutritionist at [phone]  -- followup with GI and with endocrine  -- call to schedule appt with urologist  -- followup here after you see the specialist

## 2017-05-12 ENCOUNTER — TELEPHONE (OUTPATIENT)
Dept: FAMILY MEDICINE CLINIC | Facility: CLINIC | Age: 42
End: 2017-05-12

## 2017-05-12 NOTE — TELEPHONE ENCOUNTER
Patient advised to increase his lantus to 48 units daily. Blood sugars were fasting. Patient advised to test his BS 2-3 times daily and to keep a log. Also advised patient to contact the endocrinologist to see if he can get in sooner.   Patient stated bouchra

## 2017-05-12 NOTE — TELEPHONE ENCOUNTER
Pt states his sugar reading for the last 3 days were:    5/12/17:  320  5/11/17:  310  5/10/17:  305    He said he is not feeling good.

## 2017-05-12 NOTE — TELEPHONE ENCOUNTER
Dr Jose Swenson advise  Patient takes 45 units of lantus daily,metformin 2500 mg/daily,tradjenta 5 mg daily

## 2017-05-15 ENCOUNTER — TELEPHONE (OUTPATIENT)
Dept: FAMILY MEDICINE CLINIC | Facility: CLINIC | Age: 42
End: 2017-05-15

## 2017-05-15 ENCOUNTER — HOSPITAL ENCOUNTER (OUTPATIENT)
Dept: CT IMAGING | Age: 42
Discharge: HOME | End: 2017-05-15
Attending: NURSE PRACTITIONER
Payer: MEDICARE

## 2017-05-15 DIAGNOSIS — R10.32 LEFT LOWER QUADRANT PAIN: ICD-10-CM

## 2017-05-15 PROCEDURE — 74177 CT ABD & PELVIS W/CONTRAST: CPT | Performed by: NURSE PRACTITIONER

## 2017-05-15 RX ORDER — SIMVASTATIN 40 MG
TABLET ORAL
Qty: 90 TABLET | Refills: 0 | Status: SHIPPED | OUTPATIENT
Start: 2017-05-15 | End: 2017-09-18

## 2017-05-15 RX ORDER — INSULIN GLARGINE 100 [IU]/ML
INJECTION, SOLUTION SUBCUTANEOUS
Qty: 45 ML | Refills: 0 | Status: SHIPPED | OUTPATIENT
Start: 2017-05-15 | End: 2017-06-27

## 2017-05-15 NOTE — TELEPHONE ENCOUNTER
Per Dr Luis Capone is not able to prescribe dilaudid for the patient,especially for abdominal pain  Patient informed that Dr Eugenia Torrez is not able to prescribe dilaudid for abdominal pain.  Patient was directed to his gastroenterologist for rx for pain since

## 2017-05-31 PROBLEM — Z79.4 UNCONTROLLED TYPE 2 DIABETES MELLITUS WITH HYPERGLYCEMIA, WITH LONG-TERM CURRENT USE OF INSULIN (HCC): Status: ACTIVE | Noted: 2017-05-31

## 2017-05-31 PROBLEM — E11.65 UNCONTROLLED TYPE 2 DIABETES MELLITUS WITH HYPERGLYCEMIA, WITH LONG-TERM CURRENT USE OF INSULIN (HCC): Status: ACTIVE | Noted: 2017-05-31

## 2017-05-31 PROBLEM — E11.638 TYPE 2 DIABETES MELLITUS WITH ORAL COMPLICATION, WITH LONG-TERM CURRENT USE OF INSULIN (HCC): Status: RESOLVED | Noted: 2017-03-07 | Resolved: 2017-05-31

## 2017-05-31 PROBLEM — Z79.4 TYPE 2 DIABETES MELLITUS WITH ORAL COMPLICATION, WITH LONG-TERM CURRENT USE OF INSULIN (HCC): Status: RESOLVED | Noted: 2017-03-07 | Resolved: 2017-05-31

## 2017-06-15 ENCOUNTER — APPOINTMENT (OUTPATIENT)
Dept: GENERAL RADIOLOGY | Age: 42
End: 2017-06-15
Attending: PHYSICIAN ASSISTANT
Payer: MEDICARE

## 2017-06-15 ENCOUNTER — HOSPITAL ENCOUNTER (EMERGENCY)
Age: 42
Discharge: HOME | End: 2017-06-15
Payer: MEDICARE

## 2017-06-15 VITALS
HEART RATE: 100 BPM | HEIGHT: 72 IN | DIASTOLIC BLOOD PRESSURE: 103 MMHG | BODY MASS INDEX: 35.89 KG/M2 | SYSTOLIC BLOOD PRESSURE: 167 MMHG | OXYGEN SATURATION: 98 % | WEIGHT: 265 LBS | RESPIRATION RATE: 22 BRPM | TEMPERATURE: 98 F

## 2017-06-15 DIAGNOSIS — J06.9 UPPER RESPIRATORY TRACT INFECTION, UNSPECIFIED TYPE: ICD-10-CM

## 2017-06-15 DIAGNOSIS — J02.9 PHARYNGITIS, UNSPECIFIED ETIOLOGY: Primary | ICD-10-CM

## 2017-06-15 DIAGNOSIS — J01.90 ACUTE SINUSITIS, RECURRENCE NOT SPECIFIED, UNSPECIFIED LOCATION: ICD-10-CM

## 2017-06-15 PROCEDURE — 71020 XR CHEST PA + LAT CHEST (CPT=71020): CPT | Performed by: PHYSICIAN ASSISTANT

## 2017-06-15 PROCEDURE — 80053 COMPREHEN METABOLIC PANEL: CPT | Performed by: PHYSICIAN ASSISTANT

## 2017-06-15 PROCEDURE — 87430 STREP A AG IA: CPT | Performed by: PHYSICIAN ASSISTANT

## 2017-06-15 PROCEDURE — 87081 CULTURE SCREEN ONLY: CPT | Performed by: PHYSICIAN ASSISTANT

## 2017-06-15 PROCEDURE — 96360 HYDRATION IV INFUSION INIT: CPT

## 2017-06-15 PROCEDURE — 99283 EMERGENCY DEPT VISIT LOW MDM: CPT

## 2017-06-15 PROCEDURE — 99284 EMERGENCY DEPT VISIT MOD MDM: CPT

## 2017-06-15 PROCEDURE — 85025 COMPLETE CBC W/AUTO DIFF WBC: CPT | Performed by: PHYSICIAN ASSISTANT

## 2017-06-15 RX ORDER — AZITHROMYCIN 250 MG/1
TABLET, FILM COATED ORAL
Qty: 1 PACKAGE | Refills: 0 | Status: SHIPPED | OUTPATIENT
Start: 2017-06-15 | End: 2017-06-20

## 2017-06-15 NOTE — ED NOTES
Pt discharged home. Pt left ambulatory in stable condition. Pt verbalized understanding of discharge instructions. All questions answered at this time.

## 2017-06-15 NOTE — ED INITIAL ASSESSMENT (HPI)
REPORTS FEVER AND SORE THROAT AND SINUS PRESSURE FOR PAST SEVERAL DAYS.   HAS NOT TAKEN MEDS FOR PAIN

## 2017-06-15 NOTE — ED PROVIDER NOTES
I reviewed that chart and discussed the case. I have examined the patient and noted findings of a sore throat, postnasal drainage body aches cough. He said low-grade fevers at home. Throat is mildly red no meningismus does not look ill or septic.   Will

## 2017-06-18 RX ORDER — LINAGLIPTIN 5 MG/1
TABLET, FILM COATED ORAL
Qty: 30 TABLET | Refills: 1 | Status: SHIPPED | OUTPATIENT
Start: 2017-06-18 | End: 2017-08-18

## 2017-06-19 RX ORDER — LINAGLIPTIN 5 MG/1
TABLET, FILM COATED ORAL
Qty: 30 TABLET | Refills: 0 | OUTPATIENT
Start: 2017-06-19

## 2017-06-25 ENCOUNTER — HOSPITAL ENCOUNTER (EMERGENCY)
Age: 42
Discharge: HOME | End: 2017-06-25
Payer: MEDICARE

## 2017-06-25 VITALS
RESPIRATION RATE: 18 BRPM | WEIGHT: 265 LBS | BODY MASS INDEX: 35.89 KG/M2 | DIASTOLIC BLOOD PRESSURE: 90 MMHG | SYSTOLIC BLOOD PRESSURE: 133 MMHG | OXYGEN SATURATION: 97 % | TEMPERATURE: 98 F | HEIGHT: 72 IN | HEART RATE: 80 BPM

## 2017-06-25 DIAGNOSIS — H10.32 ACUTE CONJUNCTIVITIS OF LEFT EYE, UNSPECIFIED ACUTE CONJUNCTIVITIS TYPE: Primary | ICD-10-CM

## 2017-06-25 PROCEDURE — 99283 EMERGENCY DEPT VISIT LOW MDM: CPT

## 2017-06-25 PROCEDURE — 99213 OFFICE O/P EST LOW 20 MIN: CPT

## 2017-06-25 RX ORDER — TETRACAINE HYDROCHLORIDE 5 MG/ML
SOLUTION OPHTHALMIC
Status: COMPLETED
Start: 2017-06-25 | End: 2017-06-25

## 2017-06-25 RX ORDER — POLYMYXIN B SULFATE AND TRIMETHOPRIM 1; 10000 MG/ML; [USP'U]/ML
1 SOLUTION OPHTHALMIC EVERY 6 HOURS
Qty: 10 ML | Refills: 0 | Status: SHIPPED | OUTPATIENT
Start: 2017-06-25 | End: 2017-07-02

## 2017-06-25 RX ORDER — KETOROLAC TROMETHAMINE 4 MG/ML
1 SOLUTION/ DROPS OPHTHALMIC 4 TIMES DAILY
Qty: 5 ML | Refills: 0 | Status: SHIPPED | OUTPATIENT
Start: 2017-06-25 | End: 2017-07-18 | Stop reason: ALTCHOICE

## 2017-06-25 NOTE — ED PROVIDER NOTES
Patient Seen in: [facility] Emergency Department In [facility]    History   Patient presents with:   Eye Visual Problem (opthalmic)    Stated Complaint: \"I cannot open my eye, it hurts\" denies injury     HPI    Valentino Section is a 43-year-old male who presents for date: ANESTH,SURGERY SHOULDER [facility]  2016: APPENDECTOMY  No date: CATH DRUG ELUTING STENT      Comment: left leg  11/13: COLONOSCOPY      Comment: poor prep  11/19/2013: COLONOSCOPY      Comment: Procedure: COLONOSCOPY;  Surgeon: Miguel Campbell, Cramping. Insulin Pen Needle (BD PEN NEEDLE SHORT U/F) 31G X 8 MM Does not apply Misc,  USE AS DIRECTED ONCE DAILY   tamsulosin HCl (FLOMAX) 0.4 MG Oral Cap,  nightly. Lancets Does not apply Misc,  Inject 1 each into the skin 2 (two) times daily. foreign bodies found. Left eye exhibits chemosis. Left eye exhibits no exudate and no hordeolum. No foreign body present in the left eye. Left conjunctiva is injected. No scleral icterus. Fundoscopic exam:       The left eye shows no hemorrhage.  The left diagnosis)    Disposition:  Discharge    Follow-up:  No follow-up provider specified.     Medications Prescribed:  Discharge Medication List as of 6/25/2017  2:05 PM    START taking these medications    Ketorolac Tromethamine 0.4 % Ophthalmic Solution  Appl

## 2017-07-07 ENCOUNTER — APPOINTMENT (OUTPATIENT)
Dept: CT IMAGING | Age: 42
End: 2017-07-07
Attending: EMERGENCY MEDICINE
Payer: MEDICARE

## 2017-07-07 ENCOUNTER — HOSPITAL ENCOUNTER (EMERGENCY)
Age: 42
Discharge: HOME | End: 2017-07-07
Attending: EMERGENCY MEDICINE
Payer: MEDICARE

## 2017-07-07 VITALS
WEIGHT: 265 LBS | BODY MASS INDEX: 35.89 KG/M2 | HEART RATE: 77 BPM | TEMPERATURE: 99 F | SYSTOLIC BLOOD PRESSURE: 137 MMHG | DIASTOLIC BLOOD PRESSURE: 80 MMHG | RESPIRATION RATE: 18 BRPM | OXYGEN SATURATION: 98 % | HEIGHT: 72 IN

## 2017-07-07 DIAGNOSIS — G89.29 CHRONIC ABDOMINAL PAIN: Primary | ICD-10-CM

## 2017-07-07 DIAGNOSIS — R10.9 CHRONIC ABDOMINAL PAIN: Primary | ICD-10-CM

## 2017-07-07 LAB
ALBUMIN SERPL-MCNC: 4 G/DL (ref 3.5–4.8)
ALP LIVER SERPL-CCNC: 54 U/L (ref 45–117)
ALT SERPL-CCNC: 37 U/L (ref 17–63)
AST SERPL-CCNC: 20 U/L (ref 15–41)
BASOPHILS # BLD AUTO: 0.08 X10(3) UL (ref 0–0.1)
BASOPHILS NFR BLD AUTO: 0.7 %
BILIRUB SERPL-MCNC: 0.4 MG/DL (ref 0.1–2)
BILIRUB UR QL STRIP.AUTO: NEGATIVE
BUN BLD-MCNC: 10 MG/DL (ref 8–20)
CALCIUM BLD-MCNC: 8.7 MG/DL (ref 8.3–10.3)
CHLORIDE: 102 MMOL/L (ref 101–111)
CO2: 25 MMOL/L (ref 22–32)
COLOR UR AUTO: YELLOW
CREAT BLD-MCNC: 1.06 MG/DL (ref 0.7–1.3)
EOSINOPHIL # BLD AUTO: 0.09 X10(3) UL (ref 0–0.3)
EOSINOPHIL NFR BLD AUTO: 0.8 %
ERYTHROCYTE [DISTWIDTH] IN BLOOD BY AUTOMATED COUNT: 13.1 % (ref 11.5–16)
GLUCOSE BLD-MCNC: 181 MG/DL (ref 70–99)
GLUCOSE UR STRIP.AUTO-MCNC: 100 MG/DL
GRAN CASTS #/AREA URNS LPF: PRESENT /LPF
HCT VFR BLD AUTO: 42.4 % (ref 37–53)
HGB BLD-MCNC: 15.1 G/DL (ref 13–17)
IMMATURE GRANULOCYTE COUNT: 0.07 X10(3) UL (ref 0–1)
IMMATURE GRANULOCYTE RATIO %: 0.6 %
KETONES UR STRIP.AUTO-MCNC: 15 MG/DL
LEUKOCYTE ESTERASE UR QL STRIP.AUTO: NEGATIVE
LIPASE: 258 U/L (ref 73–393)
LYMPHOCYTES # BLD AUTO: 2.37 X10(3) UL (ref 0.9–4)
LYMPHOCYTES NFR BLD AUTO: 20.7 %
M PROTEIN MFR SERPL ELPH: 6.6 G/DL (ref 6.1–8.3)
MCH RBC QN AUTO: 31.7 PG (ref 27–33.2)
MCHC RBC AUTO-ENTMCNC: 35.6 G/DL (ref 31–37)
MCV RBC AUTO: 88.9 FL (ref 80–99)
MONOCYTES # BLD AUTO: 1.29 X10(3) UL (ref 0.1–0.6)
MONOCYTES NFR BLD AUTO: 11.3 %
NEUTROPHIL ABS PRELIM: 7.56 X10 (3) UL (ref 1.3–6.7)
NEUTROPHILS # BLD AUTO: 7.56 X10(3) UL (ref 1.3–6.7)
NEUTROPHILS NFR BLD AUTO: 65.9 %
NITRITE UR QL STRIP.AUTO: NEGATIVE
PH UR STRIP.AUTO: 5.5 [PH] (ref 4.5–8)
PLATELET # BLD AUTO: 239 10(3)UL (ref 150–450)
POTASSIUM SERPL-SCNC: 4.1 MMOL/L (ref 3.6–5.1)
RBC # BLD AUTO: 4.77 X10(6)UL (ref 4.3–5.7)
RBC UR QL AUTO: NEGATIVE
RED CELL DISTRIBUTION WIDTH-SD: 42.5 FL (ref 35.1–46.3)
SODIUM SERPL-SCNC: 135 MMOL/L (ref 136–144)
SP GR UR STRIP.AUTO: 1.02 (ref 1–1.03)
UROBILINOGEN UR STRIP.AUTO-MCNC: 0.2 MG/DL
WBC # BLD AUTO: 11.5 X10(3) UL (ref 4–13)

## 2017-07-07 PROCEDURE — 99284 EMERGENCY DEPT VISIT MOD MDM: CPT

## 2017-07-07 PROCEDURE — 96374 THER/PROPH/DIAG INJ IV PUSH: CPT

## 2017-07-07 PROCEDURE — 80053 COMPREHEN METABOLIC PANEL: CPT | Performed by: EMERGENCY MEDICINE

## 2017-07-07 PROCEDURE — 74177 CT ABD & PELVIS W/CONTRAST: CPT | Performed by: EMERGENCY MEDICINE

## 2017-07-07 PROCEDURE — 83690 ASSAY OF LIPASE: CPT | Performed by: EMERGENCY MEDICINE

## 2017-07-07 PROCEDURE — 85025 COMPLETE CBC W/AUTO DIFF WBC: CPT | Performed by: EMERGENCY MEDICINE

## 2017-07-07 PROCEDURE — 81001 URINALYSIS AUTO W/SCOPE: CPT | Performed by: EMERGENCY MEDICINE

## 2017-07-07 PROCEDURE — 96361 HYDRATE IV INFUSION ADD-ON: CPT

## 2017-07-07 RX ORDER — HYDROMORPHONE HYDROCHLORIDE 1 MG/ML
0.5 INJECTION, SOLUTION INTRAMUSCULAR; INTRAVENOUS; SUBCUTANEOUS ONCE
Status: COMPLETED | OUTPATIENT
Start: 2017-07-07 | End: 2017-07-07

## 2017-07-07 NOTE — ED PROVIDER NOTES
Patient Seen in: THE [facility] Emergency Department In [place]    History   Patient presents with:  Abdomen/Flank Pain (GI/)    Stated Complaint: abdominal pain    HPI    45-year-old male presents emergency department complaining of abdominal pain.   Chilango MD;  Location: [facility] ENDOSCOPY  4/14: GASTRO - [facility]      Comment: normal  5/14: GASTRO - [facility]      Comment: large amount of food in stomach  No date: IR IVC FILTER PLACEMENT  No date: OTHER      Comment: open knee surgery left knee  No date: OTHER SURGICAL HIST Diabetes Mother      DM   • Other[other] [OTHER] Mother    • Heart Disorder Father    • other[other] [OTHER] Brother      back problems    • Breast Cancer Maternal Grandmother        Smoking status: Current Every Day Smoker (14) - Abnormal; Notable for the following:        Result Value    Glucose 181 (*)     Sodium 135 (*)     All other components within normal limits   URINALYSIS WITH CULTURE REFLEX - Abnormal; Notable for the following:     Clarity Urine Slightly Cloudy Marietta Aburtock Technologist)  Patient has a sore throat and cough for several days. FINDINGS:  Large body habitus is noted. Lung volumes are satisfactory. No new infiltrate or pleural effusion. Heart and pulmonary vessels are normal caliber.  Mediastinal contours are no change in MARCELLO since the previous exam done on 2/18/16. ---------------------------------------------------------------------------- Tables: Segmental pressures and Doppler waveforms: +--------------+--------+--------+-----+ ! Segment       ! Pressure! [facility], [address] [facility]. [address] [facility], [address] [place] [address]  Birthdate: Patient YOB: 1975. Age:  Patient is 40yr old. Sex:  Gender: male. Continuous wave Doppler, pressure measurement, and ankle-brachial index.   Location:  Vascular labor Left posterior tibial: Lesion: There is a 100%occlusion. Tables: Arterial flow: +------------------------------------+---------+--------+ ! Location                            ! V sys    ! Stenosis! +------------------------------------+---------+--------+ ! R +------------------------------------+---------+--------+ ! Left popliteal - proximal           !75.4cm/s !--------! +------------------------------------+---------+--------+ ! Left popliteal - distal             !-85.6cm/s!--------! +----------------------- the pubic symphysis with non-ionic intravenous contrast material. Post contrast coronal MPR imaging was performed. Dose reduction techniques were used.  Dose information is transmitted to the [facility] ([address] [place] [address] of Radiology) Anoop Brandt [address] [facility] Approved by: Renetta Vicente MD            Patient CT scan was unremarkable. Patient will be discharged home.   I am not going to be giving him narcotic pain medication as it seems he is allergic to every pain medication except Dilaudid however he is no

## 2017-08-02 ENCOUNTER — OFFICE VISIT (OUTPATIENT)
Dept: FAMILY MEDICINE CLINIC | Facility: CLINIC | Age: 42
End: 2017-08-02

## 2017-08-02 VITALS
SYSTOLIC BLOOD PRESSURE: 128 MMHG | HEART RATE: 88 BPM | HEIGHT: 72 IN | BODY MASS INDEX: 36.7 KG/M2 | DIASTOLIC BLOOD PRESSURE: 74 MMHG | WEIGHT: 271 LBS

## 2017-08-02 DIAGNOSIS — G89.29 CHRONIC LEFT SHOULDER PAIN: ICD-10-CM

## 2017-08-02 DIAGNOSIS — R10.9 LEFT SIDED ABDOMINAL PAIN: Primary | ICD-10-CM

## 2017-08-02 DIAGNOSIS — M25.512 CHRONIC LEFT SHOULDER PAIN: ICD-10-CM

## 2017-08-02 DIAGNOSIS — K21.9 GASTROESOPHAGEAL REFLUX DISEASE, ESOPHAGITIS PRESENCE NOT SPECIFIED: ICD-10-CM

## 2017-08-02 DIAGNOSIS — Z79.4 TYPE 2 DIABETES MELLITUS WITH OTHER ORAL COMPLICATION, WITH LONG-TERM CURRENT USE OF INSULIN (HCC): ICD-10-CM

## 2017-08-02 DIAGNOSIS — R10.9 INTRACTABLE ABDOMINAL PAIN: ICD-10-CM

## 2017-08-02 DIAGNOSIS — E11.638 TYPE 2 DIABETES MELLITUS WITH OTHER ORAL COMPLICATION, WITH LONG-TERM CURRENT USE OF INSULIN (HCC): ICD-10-CM

## 2017-08-02 PROCEDURE — 99215 OFFICE O/P EST HI 40 MIN: CPT | Performed by: FAMILY MEDICINE

## 2017-08-02 NOTE — PATIENT INSTRUCTIONS
-- start ensure enlive shakes in morning to have something for breakfast  -- try to eat small consistent, healthy meals consistently throughout the day  -- try to increase activity and bloating  -- call ortho surgeon to schedule followup appt -     Dr. Mary Castellon

## 2017-08-02 NOTE — PROGRESS NOTES
Tami Garcia is a 37year old male here for Patient presents with:  Lump: Left side of the abdomen, painful   Fatigue: getting worse  Rash: Left axilla  Shoulder Pain: Left shoulder pain   Knee Pain: Left knee pain      HPI:     Left shoulder pain  -h APPENDECTOMY  No date: APPENDECTOMY  No date: CATH DRUG ELUTING STENT      Comment: left leg  11/13: COLONOSCOPY      Comment: poor prep  11/19/2013: COLONOSCOPY      Comment: Procedure: COLONOSCOPY;  Surgeon: Cory Funez MD;  Be Door Rfl:    alprazolam 2 MG Oral Tab TAKE 1 TABLET BY MOUTH THREE TIMES DAILY AS NEEDED Disp: 90 tablet Rfl: 3   CloNIDine HCl 0.2 MG Oral Tab Take 1 tablet (0.2 mg total) by mouth 2 (two) times daily.  Disp: 60 tablet Rfl: 3   TraZODone HCl 150 MG Oral Tab Ta Nausea and vomiting  Prochlorperazine        Unknown    Comment:Pt cannot remember reaction  Protonix [Pantopraz*    Hives  Reglan [Metoclopram*    Hives, Nausea and vomiting  Tramadol                Hives, Itching  Tylenol [Acetaminop*    Hives, Itching

## 2017-08-03 ENCOUNTER — HOSPITAL ENCOUNTER (OUTPATIENT)
Dept: ULTRASOUND IMAGING | Age: 42
Discharge: HOME | End: 2017-08-03
Attending: FAMILY MEDICINE
Payer: MEDICARE

## 2017-08-03 DIAGNOSIS — R10.9 LEFT SIDED ABDOMINAL PAIN: ICD-10-CM

## 2017-08-03 PROCEDURE — 76700 US EXAM ABDOM COMPLETE: CPT | Performed by: FAMILY MEDICINE

## 2017-08-08 ENCOUNTER — TELEPHONE (OUTPATIENT)
Dept: FAMILY MEDICINE CLINIC | Facility: CLINIC | Age: 42
End: 2017-08-08

## 2017-08-08 NOTE — TELEPHONE ENCOUNTER
Ultrasound shows a possible lymph node or cyst  -also shows fatty liver  -we will discuss with patient when he comes in for followup  -have him come back sooner if pain worsening

## 2017-08-09 NOTE — TELEPHONE ENCOUNTER
Per Dr. Ryne Jerome, I informed the patient that the ultrasound showed a possible lymph node or cyst as well as a fatty liver. I informed the patient that Dr. Ryne Jerome wants to discuss the results further at the time of his follow up appointment.  Follow up appointm

## 2017-08-14 ENCOUNTER — OFFICE VISIT (OUTPATIENT)
Dept: FAMILY MEDICINE CLINIC | Facility: CLINIC | Age: 42
End: 2017-08-14

## 2017-08-14 VITALS
WEIGHT: 271 LBS | HEIGHT: 72 IN | HEART RATE: 86 BPM | SYSTOLIC BLOOD PRESSURE: 118 MMHG | BODY MASS INDEX: 36.7 KG/M2 | DIASTOLIC BLOOD PRESSURE: 60 MMHG

## 2017-08-14 DIAGNOSIS — L72.0 EPIDERMOID CYST: Primary | ICD-10-CM

## 2017-08-14 DIAGNOSIS — K21.9 GASTROESOPHAGEAL REFLUX DISEASE, ESOPHAGITIS PRESENCE NOT SPECIFIED: ICD-10-CM

## 2017-08-14 DIAGNOSIS — R60.0 EDEMA OF RIGHT LOWER EXTREMITY: ICD-10-CM

## 2017-08-14 PROCEDURE — 99214 OFFICE O/P EST MOD 30 MIN: CPT | Performed by: FAMILY MEDICINE

## 2017-08-14 NOTE — PATIENT INSTRUCTIONS
-- increase activity as tolerated  -- consider compression stockings during day  -- limit sodium and beer  -- call surgeon to discuss cyst

## 2017-08-14 NOTE — PROGRESS NOTES
Lisa Hannon is a 37year old male here for Patient presents with:  Test Results  Swelling: Right ankle and Right foot swelling       HPI:     Right ankle and foot swelling  -gets this off and on  -worse yesterday and today  -does note high salt in [facility] GASTRO - [facility]      Comment: large amount of food in stomach  No date: IR IVC FILTER PLACEMENT  No date: OTHER      Comment: open knee surgery left knee  No date: OTHER SURGICAL HISTORY      Comment: Head surgery/birthmark  No date: OTHER SURGICAL HISTORY Rfl: 3   NEXIUM 40 MG Oral Capsule Delayed Release TAKE ONE CAPSULE BY MOUTH TWICE DAILY Disp: 180 capsule Rfl: 0   SIMVASTATIN 40 MG Oral Tab TAKE 1 TABLET BY MOUTH EVERY NIGHT Disp: 90 tablet Rfl: 0   MetFORMIN HCl 1000 MG Oral Tab TAKE 1 TABLET qA, 1/2 Denies  --CV: Denies  --GI: Denies  --: Denies  --MSK: Denies  --NEURO: Denies  --PSYCH: Denies  --SKIN: see hpi  All other systems reviewed are negative    PHYSICAL EXAM:   /60 (BP Location: Right arm, Patient Position: Sitting, Cuff Size: large)

## 2017-08-18 DIAGNOSIS — E11.65 UNCONTROLLED TYPE 2 DIABETES MELLITUS WITH HYPERGLYCEMIA, WITH LONG-TERM CURRENT USE OF INSULIN (HCC): Primary | ICD-10-CM

## 2017-08-18 DIAGNOSIS — Z79.4 UNCONTROLLED TYPE 2 DIABETES MELLITUS WITH HYPERGLYCEMIA, WITH LONG-TERM CURRENT USE OF INSULIN (HCC): Primary | ICD-10-CM

## 2017-08-18 RX ORDER — LINAGLIPTIN 5 MG/1
TABLET, FILM COATED ORAL
Qty: 30 TABLET | Refills: 1 | Status: SHIPPED | OUTPATIENT
Start: 2017-08-18 | End: 2017-09-18

## 2017-08-18 NOTE — TELEPHONE ENCOUNTER
Dr Diane Rodas failed refill protocol because last microalb/creat was over a year ago. Did you want to order labs for this patient?

## 2017-08-21 ENCOUNTER — PATIENT OUTREACH (OUTPATIENT)
Dept: FAMILY MEDICINE CLINIC | Facility: CLINIC | Age: 42
End: 2017-08-21

## 2017-08-21 NOTE — PROGRESS NOTES
I left a detailed message on the patient's confidential voicemail stating that he is due for a Medicare Annual Wellness Visit with Dr. Derek Gold. I asked if he could please call back to schedule an appointment at his earliest convenience.

## 2017-08-24 ENCOUNTER — OFFICE VISIT (OUTPATIENT)
Dept: SURGERY | Facility: CLINIC | Age: 42
End: 2017-08-24

## 2017-08-24 VITALS
SYSTOLIC BLOOD PRESSURE: 128 MMHG | TEMPERATURE: 98 F | WEIGHT: 270 LBS | BODY MASS INDEX: 36.57 KG/M2 | HEIGHT: 72 IN | HEART RATE: 62 BPM | DIASTOLIC BLOOD PRESSURE: 83 MMHG

## 2017-08-24 DIAGNOSIS — Z01.818 PRE-OP TESTING: Primary | ICD-10-CM

## 2017-08-24 PROCEDURE — 99212 OFFICE O/P EST SF 10 MIN: CPT | Performed by: SURGERY

## 2017-08-24 NOTE — PROGRESS NOTES
Follow Up Visit Note       Active Problems      1. Pre-op testing          Chief Complaint   Patient presents with:  Abdominal Pain: left sided abdominal pain radiating to the groin, c/o abd bloating, constipation. Poor appitite.         History of Present Extrinsic asthma, unspecified     bronchial   • Rodney filter in place     LEFT LEG   • Hearing impairment     left ear hearing impaired   • Hiatal hernia    • High blood pressure     pt denies   • High cholesterol    • History of blood clots    • Hist Spouse name:                       Years of education:                 Number of children:               Social History Main Topics    Smoking status: Current Every Day Smoker                                                     Packs/day: 0.75      Years: CAPSULE BY MOUTH TWICE DAILY Disp: 180 capsule Rfl: 0   Lancets Does not apply Misc Inject 1 each into the skin 2 (two) times daily. Disp: 100 each Rfl: 2        Review of Systems  The Review of Systems has been reviewed by me during today.   Review of Syst and negative Sow's sign. Musculoskeletal: Normal range of motion. Neurological: He is alert and oriented to person, place, and time. Skin: Skin is warm and dry. Psychiatric: He has a normal mood and affect.  His behavior is normal.   Nursing

## 2017-08-30 ENCOUNTER — LAB ENCOUNTER (OUTPATIENT)
Dept: LAB | Age: 42
End: 2017-08-30
Attending: SURGERY
Payer: MEDICARE

## 2017-08-30 DIAGNOSIS — E11.65 UNCONTROLLED TYPE 2 DIABETES MELLITUS WITH HYPERGLYCEMIA, WITH LONG-TERM CURRENT USE OF INSULIN (HCC): ICD-10-CM

## 2017-08-30 DIAGNOSIS — Z79.4 UNCONTROLLED TYPE 2 DIABETES MELLITUS WITH HYPERGLYCEMIA, WITH LONG-TERM CURRENT USE OF INSULIN (HCC): ICD-10-CM

## 2017-08-30 DIAGNOSIS — Z01.818 PRE-OP TESTING: ICD-10-CM

## 2017-08-30 LAB
ALBUMIN SERPL-MCNC: 3.7 G/DL (ref 3.5–4.8)
ALP LIVER SERPL-CCNC: 71 U/L (ref 45–117)
ALT SERPL-CCNC: 46 U/L (ref 17–63)
AST SERPL-CCNC: 25 U/L (ref 15–41)
BILIRUB SERPL-MCNC: 0.3 MG/DL (ref 0.1–2)
BUN BLD-MCNC: 11 MG/DL (ref 8–20)
CALCIUM BLD-MCNC: 9 MG/DL (ref 8.3–10.3)
CHLORIDE: 103 MMOL/L (ref 101–111)
CO2: 30 MMOL/L (ref 22–32)
CREAT BLD-MCNC: 1.12 MG/DL (ref 0.7–1.3)
EST. AVERAGE GLUCOSE BLD GHB EST-MCNC: 189 MG/DL (ref 68–126)
GLUCOSE BLD-MCNC: 207 MG/DL (ref 70–99)
HBA1C MFR BLD HPLC: 8.2 % (ref ?–5.7)
M PROTEIN MFR SERPL ELPH: 6.8 G/DL (ref 6.1–8.3)
POTASSIUM SERPL-SCNC: 4.3 MMOL/L (ref 3.6–5.1)
SODIUM SERPL-SCNC: 140 MMOL/L (ref 136–144)

## 2017-08-30 PROCEDURE — 83036 HEMOGLOBIN GLYCOSYLATED A1C: CPT

## 2017-08-30 PROCEDURE — 36415 COLL VENOUS BLD VENIPUNCTURE: CPT

## 2017-08-30 PROCEDURE — 80053 COMPREHEN METABOLIC PANEL: CPT

## 2017-09-08 ENCOUNTER — LABORATORY ENCOUNTER (OUTPATIENT)
Dept: LAB | Age: 42
End: 2017-09-08
Attending: SURGERY
Payer: MEDICARE

## 2017-09-08 DIAGNOSIS — D17.1 LIPOMA OF SKIN OF ABDOMEN: Primary | ICD-10-CM

## 2017-09-08 PROCEDURE — 88304 TISSUE EXAM BY PATHOLOGIST: CPT

## 2017-09-18 ENCOUNTER — OFFICE VISIT (OUTPATIENT)
Dept: FAMILY MEDICINE CLINIC | Facility: CLINIC | Age: 42
End: 2017-09-18

## 2017-09-18 ENCOUNTER — LAB ENCOUNTER (OUTPATIENT)
Dept: LAB | Age: 42
End: 2017-09-18
Attending: FAMILY MEDICINE
Payer: MEDICARE

## 2017-09-18 VITALS
WEIGHT: 275 LBS | BODY MASS INDEX: 37.25 KG/M2 | SYSTOLIC BLOOD PRESSURE: 128 MMHG | HEIGHT: 72 IN | HEART RATE: 66 BPM | TEMPERATURE: 97 F | DIASTOLIC BLOOD PRESSURE: 72 MMHG

## 2017-09-18 DIAGNOSIS — K21.9 GASTROESOPHAGEAL REFLUX DISEASE, ESOPHAGITIS PRESENCE NOT SPECIFIED: ICD-10-CM

## 2017-09-18 DIAGNOSIS — I10 ESSENTIAL HYPERTENSION: ICD-10-CM

## 2017-09-18 DIAGNOSIS — E78.2 MIXED HYPERLIPIDEMIA: ICD-10-CM

## 2017-09-18 DIAGNOSIS — G89.29 CHRONIC LEFT SHOULDER PAIN: ICD-10-CM

## 2017-09-18 DIAGNOSIS — Z00.00 ENCOUNTER FOR ANNUAL HEALTH EXAMINATION: Primary | ICD-10-CM

## 2017-09-18 DIAGNOSIS — Z13.31 DEPRESSION SCREENING: ICD-10-CM

## 2017-09-18 DIAGNOSIS — Z23 NEED FOR VACCINATION: ICD-10-CM

## 2017-09-18 DIAGNOSIS — I73.9 PVD (PERIPHERAL VASCULAR DISEASE) (HCC): ICD-10-CM

## 2017-09-18 DIAGNOSIS — M25.512 CHRONIC LEFT SHOULDER PAIN: ICD-10-CM

## 2017-09-18 DIAGNOSIS — F17.200 TOBACCO DEPENDENCE: ICD-10-CM

## 2017-09-18 DIAGNOSIS — Z79.4 UNCONTROLLED TYPE 2 DIABETES MELLITUS WITH HYPERGLYCEMIA, WITH LONG-TERM CURRENT USE OF INSULIN (HCC): ICD-10-CM

## 2017-09-18 DIAGNOSIS — E11.65 UNCONTROLLED TYPE 2 DIABETES MELLITUS WITH HYPERGLYCEMIA, WITH LONG-TERM CURRENT USE OF INSULIN (HCC): ICD-10-CM

## 2017-09-18 DIAGNOSIS — Z13.39 SCREENING FOR ALCOHOL PROBLEM: ICD-10-CM

## 2017-09-18 DIAGNOSIS — D17.1 LIPOMA OF TORSO: ICD-10-CM

## 2017-09-18 DIAGNOSIS — I70.213 ATHEROSCLEROSIS OF NATIVE ARTERY OF BOTH LOWER EXTREMITIES WITH INTERMITTENT CLAUDICATION (HCC): ICD-10-CM

## 2017-09-18 LAB
CHOLEST SMN-MCNC: 117 MG/DL (ref ?–200)
HDLC SERPL-MCNC: 23 MG/DL (ref 45–?)
HDLC SERPL: 5.09 {RATIO} (ref ?–4.97)
LDLC SERPL CALC-MCNC: 15 MG/DL (ref ?–130)
LDLC SERPL-MCNC: 79 MG/DL (ref 5–40)
NONHDLC SERPL-MCNC: 94 MG/DL (ref ?–130)
TRIGLYCERIDES: 396 MG/DL (ref ?–150)

## 2017-09-18 PROCEDURE — G0442 ANNUAL ALCOHOL SCREEN 15 MIN: HCPCS | Performed by: FAMILY MEDICINE

## 2017-09-18 PROCEDURE — G0439 PPPS, SUBSEQ VISIT: HCPCS | Performed by: FAMILY MEDICINE

## 2017-09-18 PROCEDURE — 80061 LIPID PANEL: CPT

## 2017-09-18 PROCEDURE — 36415 COLL VENOUS BLD VENIPUNCTURE: CPT

## 2017-09-18 PROCEDURE — 90686 IIV4 VACC NO PRSV 0.5 ML IM: CPT | Performed by: FAMILY MEDICINE

## 2017-09-18 PROCEDURE — G0008 ADMIN INFLUENZA VIRUS VAC: HCPCS | Performed by: FAMILY MEDICINE

## 2017-09-18 PROCEDURE — 99214 OFFICE O/P EST MOD 30 MIN: CPT | Performed by: FAMILY MEDICINE

## 2017-09-18 PROCEDURE — G0444 DEPRESSION SCREEN ANNUAL: HCPCS | Performed by: FAMILY MEDICINE

## 2017-09-18 RX ORDER — SIMVASTATIN 40 MG
TABLET ORAL
Qty: 90 TABLET | Refills: 0 | Status: SHIPPED | OUTPATIENT
Start: 2017-09-18 | End: 2017-11-13 | Stop reason: DRUGHIGH

## 2017-09-18 RX ORDER — TAMSULOSIN HYDROCHLORIDE 0.4 MG/1
0.4 CAPSULE ORAL NIGHTLY
Qty: 90 CAPSULE | Refills: 0 | Status: SHIPPED | OUTPATIENT
Start: 2017-09-18 | End: 2017-10-04

## 2017-09-18 NOTE — PROGRESS NOTES
HPI:   Raul Donato is a 37year old male who presents for a Medicare Subsequent Annual Wellness visit (Pt already had Initial Annual Wellness).     His last annual assessment has been over 1 year: Annual Physical due on 09/14/2017       Left shoulder recent labs)     Lab Results  Component Value Date   WBC 11.5 07/07/2017   HGB 15.1 07/07/2017   .0 07/07/2017        ALLERGIES:   He is allergic to allegra [fexofenadine]; benadryl [diphenhydramine]; bentyl [dicyclomine]; flagyl [metronidazole]; le Misc Inject 1 each into the skin 2 (two) times daily. MEDICAL INFORMATION:   He  has a past medical history of Anesthesia complication; Anxiety state, unspecified; Back pain; Bipolar 1 disorder ([facility] [address]  [facility] DDD (degenerative disc disease), lum denies shortness of breath with exertion  CARDIOVASCULAR: denies chest pain on exertion  GI: denies abdominal pain, denies heartburn  : 0 per night nocturia, no complaint of urinary incontinence  MUSCULOSKELETAL: denies back pain  NEURO: denies headaches symmetric   Skin: Skin color, texture, turgor normal, no rashes or lesions   Lymph nodes: Cervical, supraclavicular, and axillary nodes normal   Neurologic: Normal              SUGGESTED VACCINATIONS - Influenza, Pneumococcal, Zoster, Tetanus     Immunizat FLULAVAL INFLUENZA VACCINE QUAD PRESERVATIVE FREE 0.5 ML    Screening for alcohol problem  -     ANNUAL ALCOHOL SCREEN 15 MIN  -     BRIEF ALCOHOL MISUSE     BPH  -     tamsulosin HCl 0.4 MG Oral Cap; Take 1 capsule (0.4 mg total) by mouth nightly. without help    Taking medications as prescribed: Able without help    Are you able to afford your medications?: No    Hearing Problems?: Yes     Functional Status     Hearing Problems?: Yes    Vision Problems? : No    Difficulty walking?: Yes    Difficult difficult have these problems made it for you to do your work, take care of things at home, or get along with other people?: Very difficult                    Advance Directives     Do you have a healthcare power of ?: No    Do you have a living wi data found.     Glaucoma Screening      Ophthalmology Visit Annually: Diabetics, FHx Glaucoma, AA>50, > 65 Data entered on: 10/11/2012   Last Dilated Eye Exam 9/17/2012       Prostate Cancer Screening      PSA  Annually There are no preventive care CREATININE (mg/dL)   Date Value   08/06/2014 0.93     Creatinine (mg/dL)   Date Value   08/30/2017 1.12    No flowsheet data found. Drug Serum Conc  Annually No results found for: DIGOXIN, DIG, VALP No flowsheet data found.     Diabetes      HgbA1C

## 2017-09-20 ENCOUNTER — OFFICE VISIT (OUTPATIENT)
Dept: SURGERY | Facility: CLINIC | Age: 42
End: 2017-09-20

## 2017-09-20 VITALS
SYSTOLIC BLOOD PRESSURE: 138 MMHG | WEIGHT: 275 LBS | DIASTOLIC BLOOD PRESSURE: 78 MMHG | HEIGHT: 66 IN | TEMPERATURE: 99 F | HEART RATE: 80 BPM | BODY MASS INDEX: 44.2 KG/M2

## 2017-09-20 DIAGNOSIS — D17.1 LIPOMA OF ABDOMINAL WALL: Primary | ICD-10-CM

## 2017-09-20 PROCEDURE — 99024 POSTOP FOLLOW-UP VISIT: CPT | Performed by: SURGERY

## 2017-09-20 NOTE — PROGRESS NOTES
Post Operative Visit Note       Active Problems  1. Lipoma of abdominal wall         Chief Complaint   Patient presents with:  Post-Op: excision of nodule/lipoma of the left lower abdomen. Pain continues.  Sterisriops in place no drainage         History of pt denies   • High cholesterol    • History of blood clots    • History of stomach ulcers    • Kidney stones    • Muscle weakness    • Other and unspecified hyperlipidemia    • RHEUMATOID ARTHRITIS    • Rheumatoid arthritis ([facility] [address]    • Type II or unspecifie Main Topics    Smoking status: Current Every Day Smoker                                                     Packs/day: 0.75      Years: 15.00          Types: Cigarettes    Smokeless tobacco: Former User                          Types: Snuff    Alcohol use: qNOON, and 1 tablet qPM WITH MEALS Disp: 225 tablet Rfl: 1   Lancets Does not apply Misc Inject 1 each into the skin 2 (two) times daily. Disp: 100 each Rfl: 2         Review of Systems  The Review of Systems has been reviewed by me during today.   Review o visit.  ·   · The patient will return to my attention on an as needed basis. ·   · The patient is encouraged to continue seeing the primary care physician for ongoing medical needs. ·   · The patient was given ample opportunity to ask questions.   The grant

## 2017-09-20 NOTE — PROGRESS NOTES
Triglycerides and hdl worse - more fruits, veggies, exercise and fish or fish oil can help  -we will talk more at next appt

## 2017-10-02 ENCOUNTER — OFFICE VISIT (OUTPATIENT)
Dept: FAMILY MEDICINE CLINIC | Facility: CLINIC | Age: 42
End: 2017-10-02

## 2017-10-02 VITALS
DIASTOLIC BLOOD PRESSURE: 84 MMHG | SYSTOLIC BLOOD PRESSURE: 144 MMHG | WEIGHT: 279 LBS | BODY MASS INDEX: 37.79 KG/M2 | TEMPERATURE: 99 F | HEART RATE: 88 BPM | HEIGHT: 72 IN | OXYGEN SATURATION: 98 %

## 2017-10-02 DIAGNOSIS — J01.10 ACUTE FRONTAL SINUSITIS, RECURRENCE NOT SPECIFIED: Primary | ICD-10-CM

## 2017-10-02 DIAGNOSIS — R05.9 COUGH: ICD-10-CM

## 2017-10-02 PROCEDURE — 99214 OFFICE O/P EST MOD 30 MIN: CPT | Performed by: FAMILY MEDICINE

## 2017-10-02 RX ORDER — BENZONATATE 200 MG/1
200 CAPSULE ORAL 3 TIMES DAILY PRN
Qty: 20 CAPSULE | Refills: 0 | Status: SHIPPED | OUTPATIENT
Start: 2017-10-02 | End: 2017-11-29

## 2017-10-02 RX ORDER — DOXYCYCLINE HYCLATE 100 MG/1
100 CAPSULE ORAL 2 TIMES DAILY
Qty: 14 CAPSULE | Refills: 0 | Status: SHIPPED | OUTPATIENT
Start: 2017-10-02 | End: 2017-11-29 | Stop reason: ALTCHOICE

## 2017-10-02 NOTE — PROGRESS NOTES
CC:  Guillermina Hardy is a 37year old male here for Patient presents with:  Shortness Of Breath  Chest Congestion  URI: runny and stuffy nose, cough, sore throat       HPI:     URI  -started almost 2 wks ago  -associated with worsening congestion, cough, INSULIN SYRINGE ULTRAFINE) 31G X 15/64\" 0.5 ML Does not apply Misc 1 each by Does not apply route 2 (two) times daily.  Disp: 100 each Rfl: 3   alprazolam 2 MG Oral Tab TAKE 1 TABLET BY MOUTH THREE TIMES DAILY AS NEEDED Disp: 90 tablet Rfl: 3   CloNIDine H Hives, Itching, Nausea and vomiting  Prochlorperazine        Unknown    Comment:Pt cannot remember reaction  Protonix [Pantopraz*    Hives  Reglan [Metoclopram*    Hives, Nausea and vomiting  Tramadol                Hives, Itching  Tylenol [Acetaminop* IR IVC FILTER PLACEMENT  No date: OTHER      Comment: open knee surgery left knee  09/08/2017: OTHER Left      Comment: excision of lipoma of abdomen  No date: OTHER SURGICAL HISTORY      Comment: Head surgery/birthmark  No date: OTHER SURGICAL HISTORY benzonatate 200 MG Oral Cap 20 capsule 0      Sig: Take 1 capsule (200 mg total) by mouth 3 (three) times daily as needed for cough. The patient indicates understanding of these issues and agrees to the plan.   The patient is asked to return in pr

## 2017-10-02 NOTE — PATIENT INSTRUCTIONS
- start otc generic mucinex - DM as needed  - humidifier will help with congestion  - start doxycycline as prescribed  - start benzonatate capsules as needed for cough  - call or followup if not improving or worsening

## 2017-10-03 ENCOUNTER — PATIENT OUTREACH (OUTPATIENT)
Dept: CASE MANAGEMENT | Age: 42
End: 2017-10-03

## 2017-10-04 ENCOUNTER — TELEPHONE (OUTPATIENT)
Dept: FAMILY MEDICINE CLINIC | Facility: CLINIC | Age: 42
End: 2017-10-04

## 2017-10-04 RX ORDER — TAMSULOSIN HYDROCHLORIDE 0.4 MG/1
0.8 CAPSULE ORAL NIGHTLY
Qty: 60 CAPSULE | Refills: 2 | Status: SHIPPED | OUTPATIENT
Start: 2017-10-04 | End: 2018-02-05

## 2017-10-31 ENCOUNTER — TELEPHONE (OUTPATIENT)
Dept: FAMILY MEDICINE CLINIC | Facility: CLINIC | Age: 42
End: 2017-10-31

## 2017-10-31 NOTE — TELEPHONE ENCOUNTER
Statement of [facility] Phys[facility] for [employer] has been signed by Dr. Dali Alex and successfully faxed to [facility] Teo Moreland Said at [phone].

## 2017-11-07 ENCOUNTER — HOSPITAL ENCOUNTER (EMERGENCY)
Age: 42
Discharge: HOME | End: 2017-11-07
Attending: EMERGENCY MEDICINE
Payer: MEDICARE

## 2017-11-07 ENCOUNTER — APPOINTMENT (OUTPATIENT)
Dept: GENERAL RADIOLOGY | Age: 42
End: 2017-11-07
Payer: MEDICARE

## 2017-11-07 ENCOUNTER — TELEPHONE (OUTPATIENT)
Dept: FAMILY MEDICINE CLINIC | Facility: CLINIC | Age: 42
End: 2017-11-07

## 2017-11-07 VITALS
WEIGHT: 275 LBS | RESPIRATION RATE: 20 BRPM | TEMPERATURE: 99 F | OXYGEN SATURATION: 99 % | HEART RATE: 74 BPM | HEIGHT: 72 IN | DIASTOLIC BLOOD PRESSURE: 67 MMHG | SYSTOLIC BLOOD PRESSURE: 137 MMHG | BODY MASS INDEX: 37.25 KG/M2

## 2017-11-07 DIAGNOSIS — J06.9 UPPER RESPIRATORY TRACT INFECTION, UNSPECIFIED TYPE: Primary | ICD-10-CM

## 2017-11-07 PROCEDURE — 80053 COMPREHEN METABOLIC PANEL: CPT | Performed by: EMERGENCY MEDICINE

## 2017-11-07 PROCEDURE — 71020 XR CHEST PA + LAT CHEST (CPT=71020): CPT

## 2017-11-07 PROCEDURE — 99284 EMERGENCY DEPT VISIT MOD MDM: CPT

## 2017-11-07 PROCEDURE — 81001 URINALYSIS AUTO W/SCOPE: CPT | Performed by: EMERGENCY MEDICINE

## 2017-11-07 PROCEDURE — 71020 XR CHEST PA + LAT CHEST (CPT=71020): CPT | Performed by: EMERGENCY MEDICINE

## 2017-11-07 PROCEDURE — 85025 COMPLETE CBC W/AUTO DIFF WBC: CPT | Performed by: EMERGENCY MEDICINE

## 2017-11-07 PROCEDURE — 83690 ASSAY OF LIPASE: CPT | Performed by: EMERGENCY MEDICINE

## 2017-11-07 PROCEDURE — 96360 HYDRATION IV INFUSION INIT: CPT

## 2017-11-07 PROCEDURE — 96361 HYDRATE IV INFUSION ADD-ON: CPT

## 2017-11-07 NOTE — ED PROVIDER NOTES
Patient Seen in: [facility] In [place]    History   Patient presents with:  Cough/URI    Stated Complaint: cough/weakness    HPI    28-year-old male presents with 1 month of cough, generalized weakness, fevers.   He reports a fever to COLONOSCOPY      Comment: poor prep  11/19/2013: COLONOSCOPY      Comment: Procedure: COLONOSCOPY;  Surgeon: Snehal Mcfarlane MD;  Location: [address] [place] [address] ENDOSCOPY  4/14: GASTRO - [facility]      Comment: normal  5/14: GASTRO - [facility]      Comment: large tyesha air exchange and clear   Heart: regular rate rhythm and no murmur   Abdomen: Soft and nontender. No abdominal masses.   No peritoneal signs   Extremities: no edema, normal peripheral pulses   Neuro: Alert oriented and nonfocal   Skin: no rashes or nodules negative for infection. Chest x-ray negative for acute process. No evidence of acute, emergent condition. Patient reporting symptoms of upper respiratory infection. Discharged to follow-up with primary care physician in 1-2 days.   Instructed to return

## 2017-11-10 ENCOUNTER — PATIENT OUTREACH (OUTPATIENT)
Dept: CASE MANAGEMENT | Age: 42
End: 2017-11-10

## 2017-11-13 ENCOUNTER — OFFICE VISIT (OUTPATIENT)
Dept: FAMILY MEDICINE CLINIC | Facility: CLINIC | Age: 42
End: 2017-11-13

## 2017-11-13 VITALS
HEART RATE: 70 BPM | WEIGHT: 281 LBS | BODY MASS INDEX: 38.06 KG/M2 | SYSTOLIC BLOOD PRESSURE: 134 MMHG | DIASTOLIC BLOOD PRESSURE: 68 MMHG | HEIGHT: 72 IN

## 2017-11-13 DIAGNOSIS — Z79.4 UNCONTROLLED TYPE 2 DIABETES MELLITUS WITH HYPERGLYCEMIA, WITH LONG-TERM CURRENT USE OF INSULIN (HCC): ICD-10-CM

## 2017-11-13 DIAGNOSIS — A08.4 VIRAL GASTROENTERITIS: Primary | ICD-10-CM

## 2017-11-13 DIAGNOSIS — J06.9 VIRAL UPPER RESPIRATORY TRACT INFECTION: ICD-10-CM

## 2017-11-13 DIAGNOSIS — E11.65 UNCONTROLLED TYPE 2 DIABETES MELLITUS WITH HYPERGLYCEMIA, WITH LONG-TERM CURRENT USE OF INSULIN (HCC): ICD-10-CM

## 2017-11-13 PROCEDURE — 99214 OFFICE O/P EST MOD 30 MIN: CPT | Performed by: FAMILY MEDICINE

## 2017-11-13 RX ORDER — SIMVASTATIN 20 MG
40 TABLET ORAL DAILY
Refills: 1 | COMMUNITY
Start: 2017-10-21 | End: 2018-05-16

## 2017-11-13 NOTE — PATIENT INSTRUCTIONS
-- continue to push fluids  -- ensure as needed  -- keep up with sugar readings  -- followup with endocrine as planned  -- followup with Dr. Kaur Harvey to discuss potentially restarting adderall for continued low energy  -- followup in 1 month, sooner if nee

## 2017-11-13 NOTE — PROGRESS NOTES
CC:  Wilbur Garza is a 40year old male here for Patient presents with:  ER F/U: [facility] on 11/07/2017 for an URI, dehydration, and a stomach virus   Weakness: Patient states that he feels very weak and tired      HPI:     URI/Viral gastro  -started the skin 2 (two) times daily with meals. Disp: 200 each Rfl: 1   Insulin Syringe-Needle U-100 (BD INSULIN SYRINGE ULTRAFINE) 31G X 15/64\" 0.5 ML Does not apply Misc 1 each by Does not apply route 2 (two) times daily.  Disp: 100 each Rfl: 3   alprazolam 2 M [Pantopraz*    Hives  Reglan [Metoclopram*    Hives, Nausea and vomiting  Tramadol                Hives, Itching  Tylenol [Acetaminop*    Hives, Itching    Family History   Problem Relation Age of Onset   • Diabetes Mother      DM   • Other Devin Crass Mother Comment: excision of lipoma of abdomen  No date: OTHER SURGICAL HISTORY      Comment: Head surgery/birthmark  No date: OTHER SURGICAL HISTORY      Comment: Knee/scope  No date: OTHER SURGICAL HISTORY      Comment: Neck surgery.  Lymph node removed in his 22

## 2017-11-29 ENCOUNTER — OFFICE VISIT (OUTPATIENT)
Dept: FAMILY MEDICINE CLINIC | Facility: CLINIC | Age: 42
End: 2017-11-29

## 2017-11-29 VITALS
SYSTOLIC BLOOD PRESSURE: 110 MMHG | BODY MASS INDEX: 38.06 KG/M2 | DIASTOLIC BLOOD PRESSURE: 72 MMHG | HEART RATE: 60 BPM | TEMPERATURE: 99 F | HEIGHT: 72 IN | WEIGHT: 281 LBS

## 2017-11-29 DIAGNOSIS — J04.0 LARYNGITIS: ICD-10-CM

## 2017-11-29 DIAGNOSIS — R11.0 NAUSEA: Primary | ICD-10-CM

## 2017-11-29 DIAGNOSIS — R53.1 WEAKNESS: ICD-10-CM

## 2017-11-29 PROCEDURE — 99214 OFFICE O/P EST MOD 30 MIN: CPT | Performed by: FAMILY MEDICINE

## 2017-11-29 NOTE — PATIENT INSTRUCTIONS
-start robitussin-DM  -try to focus on quitting smoking and decreasing alcohol intake  -- followup in 1 month    [address] [place]    Lab appointments can now be scheduled online at [email or web address]    · Go to [email or web address]  ·

## 2017-11-30 NOTE — PROGRESS NOTES
Rhodia Essex is a 40year old male here for Patient presents with:  Abdominal Pain: Rm. 1  Nausea  Weakness  Lightheadedness  Laryngitis      HPI:     Continues to feel ill and weak  -not eating very well  -little energy  -nauseous  -laryngitis  -cons Comment: Head surgery/birthmark  No date: OTHER SURGICAL HISTORY      Comment: Knee/scope  No date: OTHER SURGICAL HISTORY      Comment: Neck surgery.  Lymph node removed in his 20's   12/08/2015: OTHER SURGICAL HISTORY      Comment: Cysto  No date: REMOVAL Insulin Syringe-Needle U-100 (BD INSULIN SYRINGE ULTRAFINE) 30G X 1/2\" 1 ML Does not apply Misc Inject 1 each into the skin 2 (two) times daily with meals.  Disp: 200 each Rfl: 1   alprazolam 2 MG Oral Tab TAKE 1 TABLET BY MOUTH THREE TIMES DAILY AS NEED Itching      ROS:     --GEN: Denies  --HEENT: Denies  --RESP: Denies  --CV: Denies  --GI: chronic complaints of reflux and abd discomfort  --: Denies  --MSK: Denies  --NEURO: Denies  --PSYCH: Denies  --SKIN: Denies  All other systems reviewed are negativ

## 2017-12-04 RX ORDER — NAPROXEN SODIUM 220 MG
TABLET ORAL
Refills: 1 | COMMUNITY
Start: 2017-09-15 | End: 2018-05-02

## 2017-12-21 DIAGNOSIS — E78.2 MIXED HYPERLIPIDEMIA: ICD-10-CM

## 2017-12-21 RX ORDER — SIMVASTATIN 40 MG
TABLET ORAL
Qty: 90 TABLET | Refills: 0 | Status: SHIPPED | OUTPATIENT
Start: 2017-12-21 | End: 2018-02-28 | Stop reason: DRUGHIGH

## 2017-12-27 PROBLEM — M94.262 CHONDROMALACIA OF LEFT KNEE: Status: ACTIVE | Noted: 2017-12-27

## 2017-12-27 PROBLEM — M76.52 PATELLAR TENDINITIS OF LEFT KNEE: Status: ACTIVE | Noted: 2017-12-27

## 2017-12-27 PROBLEM — M23.92 INTERNAL DERANGEMENT OF KNEE, LEFT: Status: ACTIVE | Noted: 2017-12-27

## 2018-01-03 ENCOUNTER — TELEPHONE (OUTPATIENT)
Dept: SURGERY | Facility: CLINIC | Age: 43
End: 2018-01-03

## 2018-01-03 ENCOUNTER — HOSPITAL ENCOUNTER (OUTPATIENT)
Dept: MRI IMAGING | Age: 43
Discharge: HOME | End: 2018-01-03
Attending: ORTHOPAEDIC SURGERY
Payer: MEDICARE

## 2018-01-03 DIAGNOSIS — M76.52 PATELLAR TENDINITIS OF LEFT KNEE: ICD-10-CM

## 2018-01-03 DIAGNOSIS — M23.92 INTERNAL DERANGEMENT OF KNEE, LEFT: ICD-10-CM

## 2018-01-03 DIAGNOSIS — M94.262 CHONDROMALACIA OF LEFT KNEE: ICD-10-CM

## 2018-01-03 PROCEDURE — 73721 MRI JNT OF LWR EXTRE W/O DYE: CPT | Performed by: ORTHOPAEDIC SURGERY

## 2018-01-04 NOTE — TELEPHONE ENCOUNTER
Pt scheduled NP appt 1/9 with Scot Gonzales for shoulder and neck pain,referred by Dr Alphia Scheuermann Nair;NP paperwork endorsed to [facility],[phone] front office folder jaiden liver enzymes, puritis, icteric

## 2018-01-04 NOTE — TELEPHONE ENCOUNTER
lmtcb to schedule NP appt with the [facility]/Dr Anya Rodriguez (Medicare&Medicaid insurance) for shoulder and neck pain

## 2018-01-11 NOTE — TELEPHONE ENCOUNTER
Metformin approved qty 30 days NR  Patient past due to complete microalb. creat  Copy of lab orders mailed to patient

## 2018-01-18 RX ORDER — SIMVASTATIN 20 MG
TABLET ORAL
Qty: 90 TABLET | Refills: 0 | OUTPATIENT
Start: 2018-01-18

## 2018-01-22 ENCOUNTER — TELEPHONE (OUTPATIENT)
Dept: SURGERY | Facility: CLINIC | Age: 43
End: 2018-01-22

## 2018-01-22 ENCOUNTER — HOSPITAL ENCOUNTER (EMERGENCY)
Age: 43
Discharge: HOME | End: 2018-01-22
Attending: EMERGENCY MEDICINE
Payer: MEDICARE

## 2018-01-22 VITALS
SYSTOLIC BLOOD PRESSURE: 177 MMHG | RESPIRATION RATE: 20 BRPM | HEIGHT: 74 IN | BODY MASS INDEX: 35.94 KG/M2 | DIASTOLIC BLOOD PRESSURE: 89 MMHG | OXYGEN SATURATION: 97 % | WEIGHT: 280 LBS | TEMPERATURE: 99 F | HEART RATE: 104 BPM

## 2018-01-22 DIAGNOSIS — G89.29 OTHER CHRONIC PAIN: Primary | ICD-10-CM

## 2018-01-22 PROCEDURE — 99283 EMERGENCY DEPT VISIT LOW MDM: CPT

## 2018-01-22 PROCEDURE — 99282 EMERGENCY DEPT VISIT SF MDM: CPT

## 2018-01-22 NOTE — ED INITIAL ASSESSMENT (HPI)
Pt to ed co with pain in multiple locations states pain in left shoulder right lower back left knee and neck pain pt states that he had to cancel his schd appt w/the pain specialist this morning because he couldn't get a ride there pt states he has no more

## 2018-01-23 ENCOUNTER — TELEPHONE (OUTPATIENT)
Dept: FAMILY MEDICINE CLINIC | Facility: CLINIC | Age: 43
End: 2018-01-23

## 2018-01-23 NOTE — TELEPHONE ENCOUNTER
Pt called and said he is in a lot of pain. He was in the ER yesterday and they did nothing for him and also the pain specialist today and they didn't do anything for him either. The pain specialist said Dr. Christopher Robbins can order an MRI of the neck and back.   P

## 2018-01-23 NOTE — TELEPHONE ENCOUNTER
Dr Keith Anne recommended patient come in for appt. He cannot prescribe pain meds for the patient.   S/w patient and explained that Dr Keith Anne is not able to prescribe pain meds,especially dilaudid which patient states is the only thing that works for him, but bouchra

## 2018-01-23 NOTE — PROGRESS NOTES
HPI:    Patient ID: Lesli Sullivan is a 40year old male.     HPI    Review of Systems         Current Outpatient Prescriptions:  METFORMIN HCL 1000 MG Oral Tab TAKE ONE TABLET BY MOUTH EVERY MORNING, 1/2 TABLET BY MOUTH AT NOON AND ONE TABLET BY MOUTH E Lancets Does not apply Misc Inject 1 each into the skin 2 (two) times daily.  Disp: 100 each Rfl: 2     Allergies:  Allegra [Fexofenadi*    Anaphylaxis  Benadryl [Diphenhyd*    Anaphylaxis    Comment:Hives/itchy/vomiting/dizzy  Bentyl [Dicyclomine]    Anaph Work Related:   No        Receiving Work Comp/Disability:   No    Numeric Rating Scale:  Pain at Present:  10                                                                                                              (No Pain) 0  to  10 (Worst Pain)

## 2018-01-23 NOTE — PATIENT INSTRUCTIONS
Refill policies:    • Allow 2-3 business days for refills; controlled substances may take longer.   • Contact your pharmacy at least 5 days prior to running out of medication and have them send an electronic request or submit request through the [facility] recommended that you have a procedure or additional testing performed. [facility]) will contact your insurance carrier to obtain pre-certification or prior authorization.     Unfortunately, [employer] has seen an increase in denial of paym

## 2018-01-29 RX ORDER — SIMVASTATIN 20 MG
TABLET ORAL
Qty: 90 TABLET | Refills: 0 | OUTPATIENT
Start: 2018-01-29

## 2018-02-01 ENCOUNTER — APPOINTMENT (OUTPATIENT)
Dept: LAB | Age: 43
End: 2018-02-01
Attending: FAMILY MEDICINE
Payer: MEDICARE

## 2018-02-01 DIAGNOSIS — Z79.4 UNCONTROLLED TYPE 2 DIABETES MELLITUS WITH HYPERGLYCEMIA, WITH LONG-TERM CURRENT USE OF INSULIN (HCC): ICD-10-CM

## 2018-02-01 DIAGNOSIS — E11.65 UNCONTROLLED TYPE 2 DIABETES MELLITUS WITH HYPERGLYCEMIA, WITH LONG-TERM CURRENT USE OF INSULIN (HCC): ICD-10-CM

## 2018-02-01 LAB
CREAT UR-SCNC: 274 MG/DL
MICROALBUMIN UR-MCNC: 3.21 MG/DL
MICROALBUMIN/CREAT 24H UR-RTO: 11.7 UG/MG (ref ?–30)

## 2018-02-01 PROCEDURE — 82570 ASSAY OF URINE CREATININE: CPT

## 2018-02-01 PROCEDURE — 82043 UR ALBUMIN QUANTITATIVE: CPT

## 2018-02-05 RX ORDER — TAMSULOSIN HYDROCHLORIDE 0.4 MG/1
CAPSULE ORAL
Qty: 60 CAPSULE | Refills: 0 | Status: SHIPPED | OUTPATIENT
Start: 2018-02-05 | End: 2018-02-28

## 2018-02-07 RX ORDER — TAMSULOSIN HYDROCHLORIDE 0.4 MG/1
CAPSULE ORAL
Qty: 60 CAPSULE | Refills: 0 | OUTPATIENT
Start: 2018-02-07

## 2018-02-16 ENCOUNTER — OFFICE VISIT (OUTPATIENT)
Dept: FAMILY MEDICINE CLINIC | Facility: CLINIC | Age: 43
End: 2018-02-16

## 2018-02-16 VITALS
WEIGHT: 279 LBS | DIASTOLIC BLOOD PRESSURE: 72 MMHG | HEART RATE: 82 BPM | TEMPERATURE: 98 F | HEIGHT: 72 IN | OXYGEN SATURATION: 97 % | BODY MASS INDEX: 37.79 KG/M2 | SYSTOLIC BLOOD PRESSURE: 136 MMHG

## 2018-02-16 DIAGNOSIS — E11.9 TYPE 2 DIABETES MELLITUS WITHOUT COMPLICATION, WITHOUT LONG-TERM CURRENT USE OF INSULIN (HCC): ICD-10-CM

## 2018-02-16 DIAGNOSIS — J00 ACUTE NASOPHARYNGITIS: Primary | ICD-10-CM

## 2018-02-16 PROCEDURE — 99214 OFFICE O/P EST MOD 30 MIN: CPT | Performed by: FAMILY MEDICINE

## 2018-02-16 NOTE — PROGRESS NOTES
CC:  Magalie Zaman is a 40year old male here for Patient presents with:  URI: cough, runny nose, sinus pressure  Weakness  Lab Results: microalbumin results      HPI:     URI  -started 1 wk ago  -associated with congestion, runny nose, cough (improvin Insulin Syringe-Needle U-100 (BD INSULIN SYRINGE ULTRAFINE) 30G X 1/2\" 1 ML Does not apply Misc Inject 1 each into the skin 2 (two) times daily with meals.  Disp: 200 each Rfl: 1   Lancets Does not apply Misc Inject 1 each into the skin 2 (two) times natalie Anesthesia complication     wakes up during surgery - x 2   • Anxiety state, unspecified    • Back pain    • Bipolar 1 disorder (HCC)    • Cancer (HCC)     NECK TUMOR   • DDD (degenerative disc disease), lumbar    • Deep vein thrombosis ([facility] [address] \"Years ago\" food in stomach  No date: VASCULAR SURGERY   Social History:    Smoking status: Current Every Day Smoker                                                   Packs/day: 0.50      Years: 15.00        Types: Cigarettes  Smokeless tobacco: Former User

## 2018-02-16 NOTE — PATIENT INSTRUCTIONS
-- start humidifier at night  -- otc mucinex or mucinex-DM (generic is fine) - this will help loosen the pressure and congestion  -- keep an eye on symptoms and followup if needed

## 2018-02-28 ENCOUNTER — OFFICE VISIT (OUTPATIENT)
Dept: FAMILY MEDICINE CLINIC | Facility: CLINIC | Age: 43
End: 2018-02-28

## 2018-02-28 VITALS
DIASTOLIC BLOOD PRESSURE: 74 MMHG | WEIGHT: 281 LBS | OXYGEN SATURATION: 98 % | BODY MASS INDEX: 38.06 KG/M2 | SYSTOLIC BLOOD PRESSURE: 116 MMHG | HEIGHT: 72 IN | HEART RATE: 66 BPM | TEMPERATURE: 98 F

## 2018-02-28 DIAGNOSIS — J01.10 ACUTE FRONTAL SINUSITIS, RECURRENCE NOT SPECIFIED: Primary | ICD-10-CM

## 2018-02-28 DIAGNOSIS — H65.02 ACUTE SEROUS OTITIS MEDIA OF LEFT EAR, RECURRENCE NOT SPECIFIED: ICD-10-CM

## 2018-02-28 PROCEDURE — 99214 OFFICE O/P EST MOD 30 MIN: CPT | Performed by: FAMILY MEDICINE

## 2018-02-28 RX ORDER — TAMSULOSIN HYDROCHLORIDE 0.4 MG/1
CAPSULE ORAL
Qty: 180 CAPSULE | Refills: 1 | Status: SHIPPED | OUTPATIENT
Start: 2018-02-28 | End: 2018-08-25

## 2018-02-28 RX ORDER — AMOXICILLIN AND CLAVULANATE POTASSIUM 875; 125 MG/1; MG/1
1 TABLET, FILM COATED ORAL 2 TIMES DAILY
Qty: 14 TABLET | Refills: 0 | Status: SHIPPED | OUTPATIENT
Start: 2018-02-28 | End: 2018-03-07

## 2018-02-28 NOTE — PATIENT INSTRUCTIONS
-- start otc generic mucinex-DM with no benadryl as needed  -- humidifier can help  -- start augmentin if not improving with above

## 2018-02-28 NOTE — PROGRESS NOTES
CC:  Hallie Hill is a 40year old male here for Patient presents with:  URI: sore throat, cough, weakness, sinus pressure       HPI:     URI  -started several weeks ago  -associated with cough, sore throat, left ear pain, weakness, sinus pressure  -n Linagliptin (TRADJENTA) 5 MG Oral Tab Take 1 tablet by mouth once daily. Disp: 90 tablet Rfl: 1   Insulin Syringe-Needle U-100 (BD INSULIN SYRINGE ULTRAFINE) 30G X 1/2\" 1 ML Does not apply Misc Inject 1 each into the skin 2 (two) times daily with meals. 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   Ramakrishna Bermudez MD

## 2018-03-29 ENCOUNTER — HOSPITAL ENCOUNTER (EMERGENCY)
Age: 43
Discharge: HOME | End: 2018-03-29
Attending: EMERGENCY MEDICINE
Payer: MEDICARE

## 2018-03-29 ENCOUNTER — TELEPHONE (OUTPATIENT)
Dept: FAMILY MEDICINE CLINIC | Facility: CLINIC | Age: 43
End: 2018-03-29

## 2018-03-29 ENCOUNTER — APPOINTMENT (OUTPATIENT)
Dept: GENERAL RADIOLOGY | Age: 43
End: 2018-03-29
Attending: EMERGENCY MEDICINE
Payer: MEDICARE

## 2018-03-29 VITALS
BODY MASS INDEX: 37.25 KG/M2 | HEIGHT: 72 IN | SYSTOLIC BLOOD PRESSURE: 137 MMHG | HEART RATE: 66 BPM | RESPIRATION RATE: 14 BRPM | WEIGHT: 275 LBS | DIASTOLIC BLOOD PRESSURE: 73 MMHG | OXYGEN SATURATION: 99 % | TEMPERATURE: 99 F

## 2018-03-29 DIAGNOSIS — R10.9 CHRONIC ABDOMINAL PAIN: ICD-10-CM

## 2018-03-29 DIAGNOSIS — G89.29 CHRONIC ABDOMINAL PAIN: ICD-10-CM

## 2018-03-29 DIAGNOSIS — R07.0 THROAT PAIN: Primary | ICD-10-CM

## 2018-03-29 LAB
ALBUMIN SERPL-MCNC: 3.6 G/DL (ref 3.5–4.8)
ALP LIVER SERPL-CCNC: 61 U/L (ref 45–117)
ALT SERPL-CCNC: 40 U/L (ref 17–63)
AST SERPL-CCNC: 21 U/L (ref 15–41)
BASOPHILS # BLD AUTO: 0.06 X10(3) UL (ref 0–0.1)
BASOPHILS NFR BLD AUTO: 0.8 %
BILIRUB SERPL-MCNC: 0.4 MG/DL (ref 0.1–2)
BILIRUB UR QL STRIP.AUTO: NEGATIVE
BUN BLD-MCNC: 10 MG/DL (ref 8–20)
CALCIUM BLD-MCNC: 8.8 MG/DL (ref 8.3–10.3)
CHLORIDE: 104 MMOL/L (ref 101–111)
CLARITY UR REFRACT.AUTO: CLEAR
CO2: 28 MMOL/L (ref 22–32)
COLOR UR AUTO: YELLOW
CREAT BLD-MCNC: 1.05 MG/DL (ref 0.7–1.3)
EOSINOPHIL # BLD AUTO: 0.14 X10(3) UL (ref 0–0.3)
EOSINOPHIL NFR BLD AUTO: 1.8 %
ERYTHROCYTE [DISTWIDTH] IN BLOOD BY AUTOMATED COUNT: 13.5 % (ref 11.5–16)
GLUCOSE BLD-MCNC: 132 MG/DL (ref 70–99)
GLUCOSE UR STRIP.AUTO-MCNC: NEGATIVE MG/DL
HCT VFR BLD AUTO: 41.6 % (ref 37–53)
HGB BLD-MCNC: 14.5 G/DL (ref 13–17)
IMMATURE GRANULOCYTE COUNT: 0.04 X10(3) UL (ref 0–1)
IMMATURE GRANULOCYTE RATIO %: 0.5 %
LEUKOCYTE ESTERASE UR QL STRIP.AUTO: NEGATIVE
LIPASE: 252 U/L (ref 73–393)
LYMPHOCYTES # BLD AUTO: 1.72 X10(3) UL (ref 0.9–4)
LYMPHOCYTES NFR BLD AUTO: 22.2 %
M PROTEIN MFR SERPL ELPH: 6.5 G/DL (ref 6.1–8.3)
MCH RBC QN AUTO: 32.3 PG (ref 27–33.2)
MCHC RBC AUTO-ENTMCNC: 34.9 G/DL (ref 31–37)
MCV RBC AUTO: 92.7 FL (ref 80–99)
MONOCYTES # BLD AUTO: 0.94 X10(3) UL (ref 0.1–1)
MONOCYTES NFR BLD AUTO: 12.1 %
MONOSCREEN: NEGATIVE
NEUTROPHIL ABS PRELIM: 4.85 X10 (3) UL (ref 1.3–6.7)
NEUTROPHILS # BLD AUTO: 4.85 X10(3) UL (ref 1.3–6.7)
NEUTROPHILS NFR BLD AUTO: 62.6 %
NITRITE UR QL STRIP.AUTO: NEGATIVE
PH UR STRIP.AUTO: 6 [PH] (ref 4.5–8)
PLATELET # BLD AUTO: 222 10(3)UL (ref 150–450)
POTASSIUM SERPL-SCNC: 3.9 MMOL/L (ref 3.6–5.1)
PROT UR STRIP.AUTO-MCNC: NEGATIVE MG/DL
RBC # BLD AUTO: 4.49 X10(6)UL (ref 4.3–5.7)
RBC UR QL AUTO: NEGATIVE
RED CELL DISTRIBUTION WIDTH-SD: 45.1 FL (ref 35.1–46.3)
SODIUM SERPL-SCNC: 139 MMOL/L (ref 136–144)
SP GR UR STRIP.AUTO: >=1.03 (ref 1–1.03)
UROBILINOGEN UR STRIP.AUTO-MCNC: 0.2 MG/DL
WBC # BLD AUTO: 7.8 X10(3) UL (ref 4–13)

## 2018-03-29 PROCEDURE — 83690 ASSAY OF LIPASE: CPT | Performed by: EMERGENCY MEDICINE

## 2018-03-29 PROCEDURE — 87430 STREP A AG IA: CPT | Performed by: EMERGENCY MEDICINE

## 2018-03-29 PROCEDURE — 80053 COMPREHEN METABOLIC PANEL: CPT | Performed by: EMERGENCY MEDICINE

## 2018-03-29 PROCEDURE — 86403 PARTICLE AGGLUT ANTBDY SCRN: CPT | Performed by: EMERGENCY MEDICINE

## 2018-03-29 PROCEDURE — 81003 URINALYSIS AUTO W/O SCOPE: CPT | Performed by: EMERGENCY MEDICINE

## 2018-03-29 PROCEDURE — 70360 X-RAY EXAM OF NECK: CPT | Performed by: EMERGENCY MEDICINE

## 2018-03-29 PROCEDURE — 87081 CULTURE SCREEN ONLY: CPT | Performed by: EMERGENCY MEDICINE

## 2018-03-29 PROCEDURE — 99284 EMERGENCY DEPT VISIT MOD MDM: CPT

## 2018-03-29 PROCEDURE — 71046 X-RAY EXAM CHEST 2 VIEWS: CPT | Performed by: EMERGENCY MEDICINE

## 2018-03-29 PROCEDURE — 85025 COMPLETE CBC W/AUTO DIFF WBC: CPT | Performed by: EMERGENCY MEDICINE

## 2018-03-29 PROCEDURE — 96360 HYDRATION IV INFUSION INIT: CPT

## 2018-03-29 NOTE — ED PROVIDER NOTES
Patient Seen in: [facility] In [place]    History   Patient presents with:  Abdomen/Flank Pain (GI/)  Sore Throat    Stated Complaint:     HPI    Should not presents with multiple complaints.   The patient states that he saw his doct not using a device       Past Surgical History:  No date: ANESTH,SURGERY SHOULDER BONE,OPEN  2016: APPENDECTOMY  No date: APPENDECTOMY  No date: CATH DRUG ELUTING STENT      Comment: left leg  11/13: COLONOSCOPY      Comment: poor prep  11/19/2013: Dana Johnson distress. HEENT: Normocephalic, atraumatic, pupils equal round and reactive to light, oropharynx minimally erythematous, no exudates, uvula midline, mucous membranes slightly dry. Neck: Supple. Cardiovascular: Regular rate and rhythm, no murmurs.   Respi epigastric pain and nausea which he states has gotten worse recently. Over the last week he complains of dizziness and lightheadedness as well. FINDINGS:  LUNGS:  No focal consolidation. Normal vascularity. CARDIAC:  Normal size cardiac silhouette.  ME prescription for nystatin while he pursues close GI follow-up. He has a benign abdominal exam at this time and normal lab work. Was encouraged to return for new or worsening symptoms.     Disposition and Plan     Clinical Impression:  Throat pain  (primar

## 2018-03-29 NOTE — ED NOTES
Pt states his left shoulder and knees are aching - states he was informed that he needs a replacement. Rated pain at a 9 on a zero to 10 scale. MD wade.

## 2018-03-29 NOTE — TELEPHONE ENCOUNTER
Pt called and said he had his dad bring him to urgent care because he is having stomach issues again and he wants to know if he can come in and see Dr. Sameer Pollock?   He said they were going to leave the urgent care because it was to crowded and they would have a

## 2018-04-12 DIAGNOSIS — E78.2 MIXED HYPERLIPIDEMIA: ICD-10-CM

## 2018-04-12 DIAGNOSIS — Z79.4 UNCONTROLLED TYPE 2 DIABETES MELLITUS WITH HYPERGLYCEMIA, WITH LONG-TERM CURRENT USE OF INSULIN (HCC): Primary | ICD-10-CM

## 2018-04-12 DIAGNOSIS — E11.65 UNCONTROLLED TYPE 2 DIABETES MELLITUS WITH HYPERGLYCEMIA, WITH LONG-TERM CURRENT USE OF INSULIN (HCC): Primary | ICD-10-CM

## 2018-04-12 NOTE — TELEPHONE ENCOUNTER
Dr Johnnie Lopez failed refill protocol for metformin because his last A1C was over 6 months ago  Did you want to order an A1C for patient?

## 2018-04-13 NOTE — TELEPHONE ENCOUNTER
[id number] for refill and have patient come in for fasting lipid and a1c  -followup with me after that

## 2018-04-23 ENCOUNTER — HOSPITAL ENCOUNTER (OUTPATIENT)
Facility: HOSPITAL | Age: 43
Setting detail: HOSPITAL OUTPATIENT SURGERY
Discharge: HOME | End: 2018-04-23
Attending: INTERNAL MEDICINE | Admitting: INTERNAL MEDICINE
Payer: MEDICARE

## 2018-04-23 ENCOUNTER — ANESTHESIA (OUTPATIENT)
Dept: ENDOSCOPY | Facility: HOSPITAL | Age: 43
End: 2018-04-23
Payer: MEDICARE

## 2018-04-23 ENCOUNTER — ANESTHESIA EVENT (OUTPATIENT)
Dept: ENDOSCOPY | Facility: HOSPITAL | Age: 43
End: 2018-04-23
Payer: MEDICARE

## 2018-04-23 ENCOUNTER — SURGERY (OUTPATIENT)
Age: 43
End: 2018-04-23

## 2018-04-23 VITALS
TEMPERATURE: 98 F | SYSTOLIC BLOOD PRESSURE: 139 MMHG | DIASTOLIC BLOOD PRESSURE: 77 MMHG | HEIGHT: 72 IN | BODY MASS INDEX: 30.75 KG/M2 | OXYGEN SATURATION: 96 % | WEIGHT: 227 LBS | HEART RATE: 62 BPM | RESPIRATION RATE: 16 BRPM

## 2018-04-23 DIAGNOSIS — K21.9 GASTROESOPHAGEAL REFLUX DISEASE, ESOPHAGITIS PRESENCE NOT SPECIFIED: ICD-10-CM

## 2018-04-23 PROCEDURE — 88305 TISSUE EXAM BY PATHOLOGIST: CPT | Performed by: INTERNAL MEDICINE

## 2018-04-23 PROCEDURE — 0DB58ZX EXCISION OF ESOPHAGUS, VIA NATURAL OR ARTIFICIAL OPENING ENDOSCOPIC, DIAGNOSTIC: ICD-10-PCS | Performed by: INTERNAL MEDICINE

## 2018-04-23 PROCEDURE — 82962 GLUCOSE BLOOD TEST: CPT

## 2018-04-23 PROCEDURE — 0DB68ZX EXCISION OF STOMACH, VIA NATURAL OR ARTIFICIAL OPENING ENDOSCOPIC, DIAGNOSTIC: ICD-10-PCS | Performed by: INTERNAL MEDICINE

## 2018-04-23 PROCEDURE — 0DB98ZX EXCISION OF DUODENUM, VIA NATURAL OR ARTIFICIAL OPENING ENDOSCOPIC, DIAGNOSTIC: ICD-10-PCS | Performed by: INTERNAL MEDICINE

## 2018-04-23 RX ORDER — SODIUM CHLORIDE, SODIUM LACTATE, POTASSIUM CHLORIDE, CALCIUM CHLORIDE 600; 310; 30; 20 MG/100ML; MG/100ML; MG/100ML; MG/100ML
INJECTION, SOLUTION INTRAVENOUS CONTINUOUS
Status: DISCONTINUED | OUTPATIENT
Start: 2018-04-23 | End: 2018-04-23

## 2018-04-23 RX ORDER — DEXTROSE MONOHYDRATE 25 G/50ML
50 INJECTION, SOLUTION INTRAVENOUS
Status: DISCONTINUED | OUTPATIENT
Start: 2018-04-23 | End: 2018-04-23

## 2018-04-23 NOTE — H&P
The H&P dated 4/13/18 by Spencer Mcnair MD was reviewed by Spencer Mcnair MD today 4/23/18, the patient was examined and no significant changes have occurred in the patient's condition since the H&P was performed.   I discussed with the patient and/or legal represe

## 2018-04-23 NOTE — ANESTHESIA PREPROCEDURE EVALUATION
PRE-OP EVALUATION    Patient Name: Jess Moreira    Pre-op Diagnosis: Gastroesophageal reflux disease, esophagitis presence not specified [K21.9]    Procedure(s):  ESOPHAGOGASTRODUODENOSCOPY     Surgeon(s) and Role:     Johann Monterroso MD - Primary    Pr times daily. Disp: 60 tablet Rfl: 3   TraZODone HCl 150 MG Oral Tab Take 3 tablets (450 mg total) by mouth nightly. Disp: 90 tablet Rfl: 3   simvastatin 20 MG Oral Tab Take 40 mg by mouth daily. Disp:  Rfl: 1       Allergies: Allegra [Fexofenadine];  Ge Sell OTHER SURGICAL HISTORY      Comment: Neck surgery.  Lymph node removed in his 20's   12/08/2015: OTHER SURGICAL HISTORY      Comment: Cysto  No date: REMOVAL OF TONSILS,12+ Y/O  No date: TONSILLECTOMY  5/15: UPPER GI ENDO NO BARRETTS      Comment: food in s

## 2018-04-23 NOTE — OPERATIVE REPORT
[facility]                                                                                                        EGD Operative Report    Fartun Briones Patient Status:  [facility] in stable condition. Findings:    ESOPHAGUS:  Normal.  Biopsied. STOMACH:  Normal.  Biopsied. DUODENUM:  Normal.  Biopsied    Recommendations: pt to f/u with [facility] for further gastroparesis care  Discharge:   The patient was given an after visit summary

## 2018-04-23 NOTE — ANESTHESIA POSTPROCEDURE EVALUATION
[address] Patient Status:  Hospital Outpatient Surgery   Age/Gender 40year old male MRN [id number]   Location [facility]. Attending Venus Rinaldi MD   Hosp Day # 0 PCP Yanni Carrillo MD       Anesthesia Post-op Note

## 2018-04-25 NOTE — PROGRESS NOTES
4/25/2018  [address]    Dear Ines Aguilera,       Here are the biopsy/pathology findings from your recent EGD (Upper  Endoscopy):    a normal biopsy of the stomach, small intestines and esophagus with no evidence of i

## 2018-05-03 ENCOUNTER — TELEPHONE (OUTPATIENT)
Dept: FAMILY MEDICINE CLINIC | Facility: CLINIC | Age: 43
End: 2018-05-03

## 2018-05-03 NOTE — TELEPHONE ENCOUNTER
I left a message on the patient's confidential voice mail stating that I am calling to inform him that he is due for a hypertension follow up with Dr. Sameer Pollock. I asked the patient to call the office at his earliest convenience to set up the appointment.

## 2018-05-16 ENCOUNTER — OFFICE VISIT (OUTPATIENT)
Dept: FAMILY MEDICINE CLINIC | Facility: CLINIC | Age: 43
End: 2018-05-16

## 2018-05-16 VITALS
SYSTOLIC BLOOD PRESSURE: 130 MMHG | WEIGHT: 275 LBS | HEIGHT: 72 IN | OXYGEN SATURATION: 98 % | HEART RATE: 98 BPM | BODY MASS INDEX: 37.25 KG/M2 | DIASTOLIC BLOOD PRESSURE: 84 MMHG | TEMPERATURE: 99 F

## 2018-05-16 DIAGNOSIS — I10 ESSENTIAL HYPERTENSION: ICD-10-CM

## 2018-05-16 DIAGNOSIS — Z79.4 UNCONTROLLED TYPE 2 DIABETES MELLITUS WITH HYPERGLYCEMIA, WITH LONG-TERM CURRENT USE OF INSULIN (HCC): ICD-10-CM

## 2018-05-16 DIAGNOSIS — E11.65 UNCONTROLLED TYPE 2 DIABETES MELLITUS WITH HYPERGLYCEMIA, WITH LONG-TERM CURRENT USE OF INSULIN (HCC): ICD-10-CM

## 2018-05-16 DIAGNOSIS — E78.2 MIXED HYPERLIPIDEMIA: ICD-10-CM

## 2018-05-16 DIAGNOSIS — J00 ACUTE NASOPHARYNGITIS: Primary | ICD-10-CM

## 2018-05-16 PROCEDURE — 99214 OFFICE O/P EST MOD 30 MIN: CPT | Performed by: FAMILY MEDICINE

## 2018-05-16 RX ORDER — SIMVASTATIN 20 MG
40 TABLET ORAL DAILY
Qty: 90 TABLET | Refills: 1 | Status: SHIPPED | OUTPATIENT
Start: 2018-05-16 | End: 2018-07-23

## 2018-05-16 NOTE — PROGRESS NOTES
CC:  Jess Moreira is a 40year old male here for Patient presents with:  URI: light headed, nasal congestion, cough started approximately 1 week ago.   Hypertension  Hyperlipidemia      HPI:     URI  -started 1 wk ago  -associated with congestion, fati BY MOUTH EVERY NIGHT Disp: 180 capsule Rfl: 1   NEXIUM 40 MG Oral Capsule Delayed Release Take 1 capsule (40 mg total) by mouth 2 (two) times daily. Disp: 180 capsule Rfl: 1   Lancets Does not apply Misc Inject 1 each into the skin 2 (two) times daily.  Dis complication     wakes up during surgery - x 2   • Anxiety state, unspecified    • Back pain    • Back problem    • Bipolar 1 disorder (HCC)    • Cancer (HCC)     NECK TUMOR   • DDD (degenerative disc disease), lumbar    • Deep vein thrombosis ([facility] [address] \"Year UPPER GI ENDO NO BARRETTS      Comment: food in stomach  No date: VASCULAR SURGERY   Social History:    Smoking status: Current Every Day Smoker                                                   Packs/day: 0.50      Years: 15.00        Types: Cigarettes  S

## 2018-05-16 NOTE — PATIENT INSTRUCTIONS
-- rest, fluids, mucinex as needed for congestion  -- limit alcohol and smoking  -- drink glucerna 2-3 per day until feeling better  -- followup if not improving or worsening  -- come in next month for fasting labs

## 2018-05-17 ENCOUNTER — TELEPHONE (OUTPATIENT)
Dept: FAMILY MEDICINE CLINIC | Facility: CLINIC | Age: 43
End: 2018-05-17

## 2018-05-17 NOTE — TELEPHONE ENCOUNTER
rx pended-need to confirm dosage with Dr Linda Huang rx for simvastatin 40 mg qty 90 + 1 refill sent to [address] for patient

## 2018-05-17 NOTE — TELEPHONE ENCOUNTER
Mary Lou from [facility] called and said Simvastatin is not covered by [facility] the way it was written. Amie Guerrero wants to know if she can change it from 2-20mg per day to 1-40 per day? Amie Guerrero can be reached at [phone].

## 2018-05-18 RX ORDER — NAPROXEN SODIUM 220 MG
TABLET ORAL
Refills: 3 | COMMUNITY
Start: 2018-05-04

## 2018-05-18 RX ORDER — SIMVASTATIN 40 MG
40 TABLET ORAL NIGHTLY
Qty: 90 TABLET | Refills: 1 | Status: SHIPPED | OUTPATIENT
Start: 2018-05-18 | End: 2018-12-01

## 2018-05-27 ENCOUNTER — APPOINTMENT (OUTPATIENT)
Dept: GENERAL RADIOLOGY | Age: 43
End: 2018-05-27
Attending: EMERGENCY MEDICINE
Payer: MEDICARE

## 2018-05-27 ENCOUNTER — HOSPITAL ENCOUNTER (EMERGENCY)
Age: 43
Discharge: HOME | End: 2018-05-27
Attending: EMERGENCY MEDICINE
Payer: MEDICARE

## 2018-05-27 VITALS
BODY MASS INDEX: 37.25 KG/M2 | WEIGHT: 275 LBS | OXYGEN SATURATION: 98 % | TEMPERATURE: 98 F | DIASTOLIC BLOOD PRESSURE: 78 MMHG | HEIGHT: 72 IN | RESPIRATION RATE: 14 BRPM | SYSTOLIC BLOOD PRESSURE: 139 MMHG | HEART RATE: 70 BPM

## 2018-05-27 DIAGNOSIS — J02.9 VIRAL PHARYNGITIS: ICD-10-CM

## 2018-05-27 DIAGNOSIS — J06.9 UPPER RESPIRATORY TRACT INFECTION, UNSPECIFIED TYPE: Primary | ICD-10-CM

## 2018-05-27 DIAGNOSIS — J01.90 ACUTE SINUSITIS, RECURRENCE NOT SPECIFIED, UNSPECIFIED LOCATION: ICD-10-CM

## 2018-05-27 PROCEDURE — 71046 X-RAY EXAM CHEST 2 VIEWS: CPT | Performed by: EMERGENCY MEDICINE

## 2018-05-27 PROCEDURE — 85025 COMPLETE CBC W/AUTO DIFF WBC: CPT | Performed by: EMERGENCY MEDICINE

## 2018-05-27 PROCEDURE — 96360 HYDRATION IV INFUSION INIT: CPT

## 2018-05-27 PROCEDURE — 87081 CULTURE SCREEN ONLY: CPT | Performed by: EMERGENCY MEDICINE

## 2018-05-27 PROCEDURE — 82962 GLUCOSE BLOOD TEST: CPT

## 2018-05-27 PROCEDURE — 99284 EMERGENCY DEPT VISIT MOD MDM: CPT

## 2018-05-27 PROCEDURE — 87430 STREP A AG IA: CPT | Performed by: EMERGENCY MEDICINE

## 2018-05-27 PROCEDURE — 80053 COMPREHEN METABOLIC PANEL: CPT | Performed by: EMERGENCY MEDICINE

## 2018-05-27 RX ORDER — AMOXICILLIN AND CLAVULANATE POTASSIUM 875; 125 MG/1; MG/1
1 TABLET, FILM COATED ORAL 2 TIMES DAILY
Qty: 20 TABLET | Refills: 0 | Status: SHIPPED | OUTPATIENT
Start: 2018-05-27 | End: 2018-06-06

## 2018-05-27 RX ORDER — SODIUM CHLORIDE 9 MG/ML
INJECTION, SOLUTION INTRAVENOUS ONCE
Status: COMPLETED | OUTPATIENT
Start: 2018-05-27 | End: 2018-05-27

## 2018-05-28 NOTE — ED NOTES
Patient back from x-ray, resting on cart, respirations easy and non labored. No distress noted. Call light within reach. Will continue to monitor.

## 2018-05-28 NOTE — ED PROVIDER NOTES
Patient Seen in: [facility] In [place]    History   Patient presents with:  Fatigue (constitutional, neurologic)    Stated Complaint: low blood glucose    HPI    Patient is a 41-year-old male presents emergency room with a history of ARTHRITIS    • Rheumatoid arthritis ([facility] [address]    • Type II or unspecified type diabetes mellitus without mention of complication, not stated as uncontrolled    • Unspecified essential hypertension    • Unspecified sleep apnea     not using a device       Past Temporal  SpO2: 97 %  O2 Device: None (Room air)    Current:/78   Pulse 70   Temp 98.2 °F (36.8 °C) (Temporal)   Resp 14   Ht 182.9 cm (6')   Wt 124.7 kg   SpO2 98%   BMI 37.30 kg/m²         Physical Exam  GENERAL: Well-developed, well-nourished male PLATELET.   Procedure                               Abnormality         Status                     ---------                               -----------         ------                     CBC W/ DIFFERENTIAL[[id number]]          Abnormal            Final resul home with no further complaints            Disposition and Plan     Clinical Impression:  Upper respiratory tract infection, unspecified type  (primary encounter diagnosis)  Acute sinusitis, recurrence not specified, unspecified location  Viral pharyngitis

## 2018-05-28 NOTE — ED INITIAL ASSESSMENT (HPI)
Patient presents with increased fatigue, dizziness and feeling lightheaded. Patient states he has decreased appetite.

## 2018-05-30 ENCOUNTER — TELEPHONE (OUTPATIENT)
Dept: FAMILY MEDICINE CLINIC | Facility: CLINIC | Age: 43
End: 2018-05-30

## 2018-05-30 NOTE — TELEPHONE ENCOUNTER
Nichole Driver is calling to see if he can get some protein shakes from Dr Minna Loaiza, please call Nichole Driver at [phone] to let him know if he can get some more.

## 2018-07-05 ENCOUNTER — APPOINTMENT (OUTPATIENT)
Dept: GENERAL RADIOLOGY | Age: 43
End: 2018-07-05
Attending: NURSE PRACTITIONER
Payer: MEDICARE

## 2018-07-05 ENCOUNTER — HOSPITAL ENCOUNTER (EMERGENCY)
Age: 43
Discharge: HOME | End: 2018-07-05
Payer: MEDICARE

## 2018-07-05 VITALS
HEART RATE: 61 BPM | SYSTOLIC BLOOD PRESSURE: 128 MMHG | DIASTOLIC BLOOD PRESSURE: 71 MMHG | RESPIRATION RATE: 16 BRPM | BODY MASS INDEX: 37.25 KG/M2 | TEMPERATURE: 98 F | WEIGHT: 275 LBS | OXYGEN SATURATION: 98 % | HEIGHT: 72 IN

## 2018-07-05 DIAGNOSIS — M54.6 CHRONIC LEFT-SIDED THORACIC BACK PAIN: Primary | ICD-10-CM

## 2018-07-05 DIAGNOSIS — G89.29 CHRONIC LEFT-SIDED THORACIC BACK PAIN: Primary | ICD-10-CM

## 2018-07-05 PROCEDURE — 99283 EMERGENCY DEPT VISIT LOW MDM: CPT

## 2018-07-05 PROCEDURE — 72072 X-RAY EXAM THORAC SPINE 3VWS: CPT | Performed by: NURSE PRACTITIONER

## 2018-07-05 RX ORDER — DIAZEPAM 5 MG/1
5 TABLET ORAL ONCE
Status: COMPLETED | OUTPATIENT
Start: 2018-07-05 | End: 2018-07-05

## 2018-07-05 RX ORDER — DIAZEPAM 2 MG/1
2 TABLET ORAL 3 TIMES DAILY PRN
Qty: 12 TABLET | Refills: 0 | Status: SHIPPED | OUTPATIENT
Start: 2018-07-05 | End: 2019-09-13 | Stop reason: ALTCHOICE

## 2018-07-05 NOTE — ED PROVIDER NOTES
Patient Seen in: [facility] In [place]    History   Patient presents with:  Back Pain (musculoskeletal)    Stated Complaint: back pain     42-year-old male who presents to the emergency room with complaints of midthoracic back pain ×4 Hearing impairment     left ear hearing impaired   • Hiatal hernia    • High blood pressure     pt denies   • High cholesterol    • History of blood clots    • History of stomach ulcers    • Kidney stones    • Muscle weakness    • Other and unspecified hyp Negative. HENT: Negative. Musculoskeletal:        Chronic back pain   Neurological: Negative. Hematological: Negative. Psychiatric/Behavioral: Negative. All other systems reviewed and are negative.       Positive for stated complaint: back pa 20:22.  INDICATIONS:  back pain  PATIENT STATED HISTORY: (As transcribed by Technologist)  Patient c/o sharp, stabbing pain from his left mid upper back to his lower left back for 4-5 days. He has a history of DJD with multiple steroid injections.     FIND follow-up with his pain specialist and his primary care physician.             Disposition and Plan     Clinical Impression:  Chronic left-sided thoracic back pain  (primary encounter diagnosis)    Disposition:  Discharge  7/5/2018  2:26 pm    Follow-up:  JOANNA

## 2018-07-09 DIAGNOSIS — E11.65 UNCONTROLLED TYPE 2 DIABETES MELLITUS WITH HYPERGLYCEMIA, WITH LONG-TERM CURRENT USE OF INSULIN (HCC): ICD-10-CM

## 2018-07-09 DIAGNOSIS — Z79.4 UNCONTROLLED TYPE 2 DIABETES MELLITUS WITH HYPERGLYCEMIA, WITH LONG-TERM CURRENT USE OF INSULIN (HCC): ICD-10-CM

## 2018-07-09 RX ORDER — LINAGLIPTIN 5 MG/1
TABLET, FILM COATED ORAL
Qty: 90 TABLET | Refills: 0 | Status: SHIPPED | OUTPATIENT
Start: 2018-07-09 | End: 2018-09-18

## 2018-07-12 ENCOUNTER — HOSPITAL ENCOUNTER (EMERGENCY)
Age: 43
Discharge: HOME | End: 2018-07-12
Attending: EMERGENCY MEDICINE
Payer: MEDICARE

## 2018-07-12 ENCOUNTER — APPOINTMENT (OUTPATIENT)
Dept: GENERAL RADIOLOGY | Age: 43
End: 2018-07-12
Attending: EMERGENCY MEDICINE
Payer: MEDICARE

## 2018-07-12 VITALS
BODY MASS INDEX: 37.25 KG/M2 | HEIGHT: 72 IN | DIASTOLIC BLOOD PRESSURE: 76 MMHG | WEIGHT: 275 LBS | OXYGEN SATURATION: 97 % | HEART RATE: 83 BPM | TEMPERATURE: 99 F | SYSTOLIC BLOOD PRESSURE: 153 MMHG | RESPIRATION RATE: 15 BRPM

## 2018-07-12 DIAGNOSIS — R11.2 NAUSEA VOMITING AND DIARRHEA: ICD-10-CM

## 2018-07-12 DIAGNOSIS — J20.9 ACUTE BRONCHITIS, UNSPECIFIED ORGANISM: Primary | ICD-10-CM

## 2018-07-12 DIAGNOSIS — R19.7 NAUSEA VOMITING AND DIARRHEA: ICD-10-CM

## 2018-07-12 LAB
ALBUMIN SERPL-MCNC: 4 G/DL (ref 3.5–4.8)
ALP LIVER SERPL-CCNC: 57 U/L (ref 45–117)
ALT SERPL-CCNC: 33 U/L (ref 17–63)
AST SERPL-CCNC: 21 U/L (ref 15–41)
BASOPHILS # BLD AUTO: 0.04 X10(3) UL (ref 0–0.1)
BASOPHILS NFR BLD AUTO: 0.4 %
BILIRUB SERPL-MCNC: 0.5 MG/DL (ref 0.1–2)
BUN BLD-MCNC: 7 MG/DL (ref 8–20)
CALCIUM BLD-MCNC: 9 MG/DL (ref 8.3–10.3)
CHLORIDE: 104 MMOL/L (ref 101–111)
CO2: 28 MMOL/L (ref 22–32)
CREAT BLD-MCNC: 1.29 MG/DL (ref 0.7–1.3)
EOSINOPHIL # BLD AUTO: 0.15 X10(3) UL (ref 0–0.3)
EOSINOPHIL NFR BLD AUTO: 1.5 %
ERYTHROCYTE [DISTWIDTH] IN BLOOD BY AUTOMATED COUNT: 12.7 % (ref 11.5–16)
GLUCOSE BLD-MCNC: 171 MG/DL (ref 70–99)
HCT VFR BLD AUTO: 43.9 % (ref 37–53)
HGB BLD-MCNC: 14.8 G/DL (ref 13–17)
IMMATURE GRANULOCYTE COUNT: 0.04 X10(3) UL (ref 0–1)
IMMATURE GRANULOCYTE RATIO %: 0.4 %
LYMPHOCYTES # BLD AUTO: 1.35 X10(3) UL (ref 0.9–4)
LYMPHOCYTES NFR BLD AUTO: 13.3 %
M PROTEIN MFR SERPL ELPH: 7.2 G/DL (ref 6.1–8.3)
MCH RBC QN AUTO: 31.4 PG (ref 27–33.2)
MCHC RBC AUTO-ENTMCNC: 33.7 G/DL (ref 31–37)
MCV RBC AUTO: 93.2 FL (ref 80–99)
MONOCYTES # BLD AUTO: 1.61 X10(3) UL (ref 0.1–1)
MONOCYTES NFR BLD AUTO: 15.9 %
NEUTROPHIL ABS PRELIM: 6.95 X10 (3) UL (ref 1.3–6.7)
NEUTROPHILS # BLD AUTO: 6.95 X10(3) UL (ref 1.3–6.7)
NEUTROPHILS NFR BLD AUTO: 68.5 %
PLATELET # BLD AUTO: 129 10(3)UL (ref 150–450)
POTASSIUM SERPL-SCNC: 4.1 MMOL/L (ref 3.6–5.1)
RBC # BLD AUTO: 4.71 X10(6)UL (ref 4.3–5.7)
RED CELL DISTRIBUTION WIDTH-SD: 43.8 FL (ref 35.1–46.3)
SODIUM SERPL-SCNC: 138 MMOL/L (ref 136–144)
WBC # BLD AUTO: 10.1 X10(3) UL (ref 4–13)

## 2018-07-12 PROCEDURE — 80053 COMPREHEN METABOLIC PANEL: CPT | Performed by: EMERGENCY MEDICINE

## 2018-07-12 PROCEDURE — 99284 EMERGENCY DEPT VISIT MOD MDM: CPT

## 2018-07-12 PROCEDURE — 96360 HYDRATION IV INFUSION INIT: CPT

## 2018-07-12 PROCEDURE — 85025 COMPLETE CBC W/AUTO DIFF WBC: CPT | Performed by: EMERGENCY MEDICINE

## 2018-07-12 PROCEDURE — 71046 X-RAY EXAM CHEST 2 VIEWS: CPT | Performed by: EMERGENCY MEDICINE

## 2018-07-12 PROCEDURE — 96361 HYDRATE IV INFUSION ADD-ON: CPT

## 2018-07-12 RX ORDER — AZITHROMYCIN 250 MG/1
TABLET, FILM COATED ORAL
Qty: 1 PACKAGE | Refills: 0 | Status: SHIPPED | OUTPATIENT
Start: 2018-07-12 | End: 2018-07-17

## 2018-07-12 RX ORDER — PREDNISONE 20 MG/1
60 TABLET ORAL DAILY
Qty: 15 TABLET | Refills: 0 | Status: SHIPPED | OUTPATIENT
Start: 2018-07-12 | End: 2018-07-17

## 2018-07-12 RX ORDER — BENZONATATE 200 MG/1
200 CAPSULE ORAL 3 TIMES DAILY PRN
Qty: 20 CAPSULE | Refills: 0 | Status: SHIPPED | OUTPATIENT
Start: 2018-07-12 | End: 2018-07-23 | Stop reason: ALTCHOICE

## 2018-07-12 RX ORDER — IPRATROPIUM BROMIDE AND ALBUTEROL SULFATE 2.5; .5 MG/3ML; MG/3ML
3 SOLUTION RESPIRATORY (INHALATION) ONCE
Status: COMPLETED | OUTPATIENT
Start: 2018-07-12 | End: 2018-07-12

## 2018-07-12 RX ORDER — ALBUTEROL SULFATE 90 UG/1
2 AEROSOL, METERED RESPIRATORY (INHALATION) EVERY 4 HOURS PRN
Qty: 1 INHALER | Refills: 0 | Status: SHIPPED | OUTPATIENT
Start: 2018-07-12 | End: 2018-08-11

## 2018-07-12 NOTE — ED INITIAL ASSESSMENT (HPI)
Not feeling well for past couple of days - c/o productive cough-- not wanting to eat or drink and feeling weak

## 2018-07-12 NOTE — ED PROVIDER NOTES
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 No hoarseness or stridor. Lungs: Rare expiratory wheezes but good air exchange. Heart exam: Normal S1-S2 without extra sounds or murmurs. Regular rate and rhythm. Skin is dry without rashes or lesions.   Abdomen: Normoactive patient stated that he was not symptomatically improved with the nebulizer treatment. I still recommended he be discharged with an inhaler in addition to a spacer and an antitussive. IV fluids were administered.   Test results and treatment plan were disc

## 2018-07-13 DIAGNOSIS — Z79.4 UNCONTROLLED TYPE 2 DIABETES MELLITUS WITH HYPERGLYCEMIA, WITH LONG-TERM CURRENT USE OF INSULIN (HCC): ICD-10-CM

## 2018-07-13 DIAGNOSIS — E11.65 UNCONTROLLED TYPE 2 DIABETES MELLITUS WITH HYPERGLYCEMIA, WITH LONG-TERM CURRENT USE OF INSULIN (HCC): ICD-10-CM

## 2018-07-13 RX ORDER — LINAGLIPTIN 5 MG/1
TABLET, FILM COATED ORAL
Qty: 90 TABLET | Refills: 0 | OUTPATIENT
Start: 2018-07-13

## 2018-07-13 NOTE — TELEPHONE ENCOUNTER
Refill of tradjenta not approved.   An rx was sent 7/9/2018 for qty 90 NR  No refill needed at this time

## 2018-07-23 ENCOUNTER — OFFICE VISIT (OUTPATIENT)
Dept: FAMILY MEDICINE CLINIC | Facility: CLINIC | Age: 43
End: 2018-07-23
Payer: MEDICARE

## 2018-07-23 VITALS
SYSTOLIC BLOOD PRESSURE: 148 MMHG | HEIGHT: 72 IN | BODY MASS INDEX: 36.7 KG/M2 | TEMPERATURE: 98 F | HEART RATE: 80 BPM | DIASTOLIC BLOOD PRESSURE: 88 MMHG | WEIGHT: 271 LBS

## 2018-07-23 DIAGNOSIS — R50.9 FEVER, UNSPECIFIED FEVER CAUSE: ICD-10-CM

## 2018-07-23 DIAGNOSIS — R05.9 COUGH: Primary | ICD-10-CM

## 2018-07-23 PROCEDURE — 99214 OFFICE O/P EST MOD 30 MIN: CPT | Performed by: FAMILY MEDICINE

## 2018-07-23 RX ORDER — NYSTATIN 100000 U/G
CREAM TOPICAL
Qty: 30 G | Refills: 0 | Status: SHIPPED | OUTPATIENT
Start: 2018-07-23 | End: 2019-03-19

## 2018-07-23 NOTE — PROGRESS NOTES
CC:  Garth Valdez is a 40year old male here for Patient presents with:  ER F/U: 07/12/2018 the patient went to the ER for bronchitis.  The patient states that he is feeling worse      HPI:     URI  -started 2 wks ago  -with cough, weakness  -was seen tablets (450 mg total) by mouth nightly. Disp: 90 tablet Rfl: 3   CloNIDine HCl 0.2 MG Oral Tab Take 1 tablet (0.2 mg total) by mouth 2 (two) times daily.  Disp: 60 tablet Rfl: 3   alprazolam 2 MG Oral Tab TAKE 1 TABLET BY MOUTH THREE TIMES DAILY AS NEEDED reaction  Protonix [Pantopraz*    HIVES  Reglan [Metoclopram*    HIVES, NAUSEA AND VOMITING  Tramadol                HIVES, ITCHING  Tylenol [Acetaminop*    HIVES, ITCHING    Family History   Problem Relation Age of Onset   • Diabetes Mother      DM   • Ot Comment: open knee surgery left knee  09/08/2017: OTHER Left      Comment: excision of lipoma of abdomen  No date: OTHER SURGICAL HISTORY      Comment: Head surgery/birthmark  No date: OTHER SURGICAL HISTORY      Comment: Knee/scope  No date: OTHER SURGICA Referrals:  None     The patient indicates understanding of these issues and agrees to the plan. The patient is asked to return in prn.   Delores Chavez MD

## 2018-07-23 NOTE — PATIENT INSTRUCTIONS
-- recheck CXR  -- continue fluids, rest  -- start eating as tolerated  -- continue albuterol as needed  -- we will call with CXR results    -- start nystatin cream for rash  -- call or come back if not improving

## 2018-07-31 ENCOUNTER — HOSPITAL ENCOUNTER (OUTPATIENT)
Dept: GENERAL RADIOLOGY | Age: 43
Discharge: HOME | End: 2018-07-31
Attending: FAMILY MEDICINE
Payer: MEDICARE

## 2018-07-31 DIAGNOSIS — R50.9 FEVER, UNSPECIFIED FEVER CAUSE: ICD-10-CM

## 2018-07-31 DIAGNOSIS — R05.9 COUGH: ICD-10-CM

## 2018-07-31 PROCEDURE — 71046 X-RAY EXAM CHEST 2 VIEWS: CPT | Performed by: FAMILY MEDICINE

## 2018-08-27 RX ORDER — TAMSULOSIN HYDROCHLORIDE 0.4 MG/1
CAPSULE ORAL
Qty: 180 CAPSULE | Refills: 0 | Status: SHIPPED | OUTPATIENT
Start: 2018-08-27 | End: 2018-12-01

## 2018-09-18 ENCOUNTER — OFFICE VISIT (OUTPATIENT)
Dept: FAMILY MEDICINE CLINIC | Facility: CLINIC | Age: 43
End: 2018-09-18
Payer: MEDICARE

## 2018-09-18 VITALS
HEIGHT: 70.25 IN | DIASTOLIC BLOOD PRESSURE: 82 MMHG | SYSTOLIC BLOOD PRESSURE: 134 MMHG | BODY MASS INDEX: 38.94 KG/M2 | WEIGHT: 272 LBS | TEMPERATURE: 98 F | HEART RATE: 86 BPM

## 2018-09-18 DIAGNOSIS — F41.1 ANXIETY STATE: ICD-10-CM

## 2018-09-18 DIAGNOSIS — G89.29 CHRONIC LEFT SHOULDER PAIN: ICD-10-CM

## 2018-09-18 DIAGNOSIS — F30.9 BIPOLAR I DISORDER, SINGLE MANIC EPISODE (HCC): ICD-10-CM

## 2018-09-18 DIAGNOSIS — R10.9 INTRACTABLE ABDOMINAL PAIN: ICD-10-CM

## 2018-09-18 DIAGNOSIS — M25.512 CHRONIC LEFT SHOULDER PAIN: ICD-10-CM

## 2018-09-18 DIAGNOSIS — Z79.4 UNCONTROLLED TYPE 2 DIABETES MELLITUS WITH HYPERGLYCEMIA, WITH LONG-TERM CURRENT USE OF INSULIN (HCC): ICD-10-CM

## 2018-09-18 DIAGNOSIS — I73.9 PVD (PERIPHERAL VASCULAR DISEASE) (HCC): ICD-10-CM

## 2018-09-18 DIAGNOSIS — F17.200 TOBACCO DEPENDENCE: ICD-10-CM

## 2018-09-18 DIAGNOSIS — E11.65 UNCONTROLLED TYPE 2 DIABETES MELLITUS WITH HYPERGLYCEMIA, WITH LONG-TERM CURRENT USE OF INSULIN (HCC): ICD-10-CM

## 2018-09-18 DIAGNOSIS — Z00.00 ENCOUNTER FOR ANNUAL HEALTH EXAMINATION: Primary | ICD-10-CM

## 2018-09-18 DIAGNOSIS — K21.9 GASTROESOPHAGEAL REFLUX DISEASE, ESOPHAGITIS PRESENCE NOT SPECIFIED: ICD-10-CM

## 2018-09-18 DIAGNOSIS — Z13.39 SCREENING FOR ALCOHOL PROBLEM: ICD-10-CM

## 2018-09-18 DIAGNOSIS — I70.213 ATHEROSCLEROSIS OF NATIVE ARTERY OF BOTH LOWER EXTREMITIES WITH INTERMITTENT CLAUDICATION (HCC): ICD-10-CM

## 2018-09-18 PROCEDURE — G0442 ANNUAL ALCOHOL SCREEN 15 MIN: HCPCS | Performed by: FAMILY MEDICINE

## 2018-09-18 PROCEDURE — 99214 OFFICE O/P EST MOD 30 MIN: CPT | Performed by: FAMILY MEDICINE

## 2018-09-18 PROCEDURE — G0439 PPPS, SUBSEQ VISIT: HCPCS | Performed by: FAMILY MEDICINE

## 2018-09-18 PROCEDURE — G0008 ADMIN INFLUENZA VIRUS VAC: HCPCS | Performed by: FAMILY MEDICINE

## 2018-09-18 PROCEDURE — 90686 IIV4 VACC NO PRSV 0.5 ML IM: CPT | Performed by: FAMILY MEDICINE

## 2018-09-18 NOTE — PATIENT INSTRUCTIONS
-- call to schedule PT for shoulder  -- call to schedule appt for vascular study  -- limit smoking and alcohol  -- followup in 3 months, sooner if needed  [address] [place] [address]/[facility]   Tests on this list are recommended by your physician but m Routine EKG is not a screening covered service except at the Welcome to Medicare Visit    Abdominal aortic aneurysm screening (once between ages 73-68)  No results found. However, due to the size of the patient record, not all encounters were searched.  Ple or performed in visit on 09/23/16   • FLU VAC NO PRSV 4 DC 3 YRS+   Orders placed or performed in visit on 12/08/14   • FLU VAC NO PRSV 4 DC 3 YRS+     *Note: Due to a large number of results and/or encounters for the requested time period, some results prescription benefits     Recommended Websites for Advanced Directives    [employer].no. org/publications/Documents/personal_dec. pdf  An information packet, including necessary form from the [employer] website. http://www. idph.stat

## 2018-09-24 NOTE — PROGRESS NOTES
HPI:   Chema [facility] is a 40year old male who presents for a Medicare Subsequent Annual Wellness visit (Pt already had Initial Annual Wellness). His last annual assessment has been over 1 year: Annual Physical due on 09/18/2018      1.  Encounter fo 0-No  Fall/Risk Scorin      Cognitive Assessment   He had a completely normal cognitive assessment- see flowsheet entries    Functional Ability/Status   Ying Ghosh has some abnormal functions as listed below:  He has no issues with dressing and aspirin. We discussed the risks and benefits of aspirin therapy. Jess Moreira is unable to use daily aspirin therapy For the following reasons:    Other: stomach issues        CAGE Alcohol screening   Jess Moreira was screened for Alcohol abuse Component Value Date    AST 21 07/12/2018    ALT 33 07/12/2018    CA 9.0 07/12/2018    ALB 4.0 07/12/2018    TSH 0.625 12/24/2015    CREATSERUM 1.29 07/12/2018     (H) 07/12/2018        CBC  (most recent labs)   Lab Results   Component Value Date SYRINGE ULTRAFINE) 30G X 1/2\" 1 ML Does not apply Misc Inject 1 each into the skin 2 (two) times daily with meals. Insulin NPH & Regular 70/30 (70-30) 100 UNIT/ML Subcutaneous Suspension Inject 75 Units into the skin 2 (two) times daily before meals. MANOMETRY (N/A, 5/6/2014); ESOPHAGOGASTRODUODENOSCOPY (EGD) (N/A, 4/24/2014); and COLONOSCOPY (N/A, 11/19/2013). His family history includes Breast Cancer in his maternal grandmother; Diabetes in his mother; Heart Disorder in his father;  Other in his br organomegaly   Extremities: Extremities normal, atraumatic, no cyanosis or edema   Pulses: 2+ and symmetric   Skin: Skin color, texture, turgor normal, no rashes or lesions   Lymph nodes: Cervical, supraclavicular, and axillary nodes normal   Neurologic: N improving his continued abd symptoms  -f/u prn    Other orders  -     NEXIUM 40 MG Oral Capsule Delayed Release;  Take 1 capsule (40 mg total) by mouth 2 (two) times daily.  -     FLULAVAL INFLUENZA VACCINE QUAD PRESERVATIVE FREE 0.5 ML         Diet assessm Results Review for a complete set of results. No flowsheet data found. Fecal Occult Blood Annually No results found for: FOB No flowsheet data found.     Glaucoma Screening      Ophthalmology Visit Annually: Diabetics, FHx Glaucoma, AA>50, > 65 Date Value   08/06/2014 43     LDL Cholesterol (mg/dL)   Date Value   09/18/2017 15    No flowsheet data found. Dilated Eye exam  Annually Data entered on: 5/31/2018   Last Dilated Eye Exam 5/30/2018     No flowsheet data found.             Ngoc Mix

## 2018-09-27 ENCOUNTER — PATIENT OUTREACH (OUTPATIENT)
Dept: CASE MANAGEMENT | Age: 43
End: 2018-09-27

## 2018-10-31 ENCOUNTER — OFFICE VISIT (OUTPATIENT)
Dept: FAMILY MEDICINE CLINIC | Facility: CLINIC | Age: 43
End: 2018-10-31
Payer: MEDICARE

## 2018-10-31 VITALS
BODY MASS INDEX: 39 KG/M2 | OXYGEN SATURATION: 99 % | SYSTOLIC BLOOD PRESSURE: 124 MMHG | HEART RATE: 61 BPM | DIASTOLIC BLOOD PRESSURE: 70 MMHG | HEIGHT: 70.75 IN | TEMPERATURE: 98 F | RESPIRATION RATE: 16 BRPM

## 2018-10-31 DIAGNOSIS — J32.8 OTHER CHRONIC SINUSITIS: ICD-10-CM

## 2018-10-31 DIAGNOSIS — J31.2 CHRONIC PHARYNGITIS: Primary | ICD-10-CM

## 2018-10-31 DIAGNOSIS — R49.0 HOARSENESS OF VOICE: ICD-10-CM

## 2018-10-31 PROCEDURE — 99214 OFFICE O/P EST MOD 30 MIN: CPT | Performed by: FAMILY MEDICINE

## 2018-10-31 PROCEDURE — 87880 STREP A ASSAY W/OPTIC: CPT | Performed by: FAMILY MEDICINE

## 2018-10-31 NOTE — PROGRESS NOTES
Pascual Mendoza is a 44year old male here for Patient presents with:  Sore Throat: pateitn c/o sore throat x 2 days, runny nose, cough x 3 days  Pain: patient c/o stomach pain       HPI:       1. Chronic pharyngitis  2. Other chronic sinusitis  3.  Hoars COLONOSCOPY;  Surgeon: Lauren Salcido MD;  Location: [facility] ENDOSCOPY   • COLONOSCOPY N/A 11/19/2013    Performed by Lauren Salcido MD at [address] [place] [address] N/A 5/6/2014    Performed by Lauren Salcido MD at [facility] ENDOSCOPY   • ES No       Medications (Active prior to today's visit):    Current Outpatient Medications:  carbamide peroxide 6.5 % Otic Solution Place 5 drops into both ears 2 (two) times daily.  Disp: 15 mL Rfl: 0   METFORMIN HCL 1000 MG Oral Tab TAKE 1 TABLET BY MOUTH EV Comment:Hives/itchy/vomiting/dizzy  Bentyl [Dicyclomine]    ANAPHYLAXIS, HIVES  Flagyl [Metronidazo*    ANAPHYLAXIS, NAUSEA AND VOMITING  Levofloxacin            ANAPHYLAXIS, HIVES, ITCHING  Morphine                ANAPHYLAXIS, HIVES  Motrin [Ibuprofen] of voice  -supportive care  -no signs of acute infection - hold off on abx  -debrox for cerumen impaction - f/u next week for washing  -referred to ENT for further evaluation          The patient is asked to return in 1 wk.     Orders This Visit:  Orders Pl

## 2018-11-07 ENCOUNTER — OFFICE VISIT (OUTPATIENT)
Dept: FAMILY MEDICINE CLINIC | Facility: CLINIC | Age: 43
End: 2018-11-07
Payer: MEDICARE

## 2018-11-07 VITALS
BODY MASS INDEX: 38.22 KG/M2 | WEIGHT: 273 LBS | TEMPERATURE: 99 F | DIASTOLIC BLOOD PRESSURE: 70 MMHG | HEIGHT: 70.75 IN | HEART RATE: 60 BPM | SYSTOLIC BLOOD PRESSURE: 100 MMHG

## 2018-11-07 DIAGNOSIS — J31.2 CHRONIC PHARYNGITIS: Primary | ICD-10-CM

## 2018-11-07 DIAGNOSIS — H61.23 BILATERAL IMPACTED CERUMEN: ICD-10-CM

## 2018-11-07 PROCEDURE — 99214 OFFICE O/P EST MOD 30 MIN: CPT | Performed by: FAMILY MEDICINE

## 2018-11-07 PROCEDURE — 69210 REMOVE IMPACTED EAR WAX UNI: CPT | Performed by: FAMILY MEDICINE

## 2018-11-07 NOTE — PROGRESS NOTES
Pascual Mendoza is a 44year old male here for Patient presents with:  Pharyngitis: Rm. 1  Sinusitis  Hoarseness      HPI:       1.  Chronic pharyngitis  -continues to have daily symptoms  -very bothersome  -makes him feel weak, low appetite  -hasn't real at [facility] ENDOSCOPY   • ESOPHAGEAL MANOMETRY N/A 5/6/2014    Performed by Xavier Alicea MD at [facility] ENDOSCOPY   • ESOPHAGOGASTRODUODENOSCOPY (EGD) N/A 4/23/2018    Performed by Abram Freitas MD at [facility] ENDOSCOPY   • ESOPHAGOGASTRODUODENOSCOPY (EGD) N/A 5/20/201 % Otic Solution Place 5 drops into both ears 2 (two) times daily.  Disp: 15 mL Rfl: 0   METFORMIN HCL 1000 MG Oral Tab TAKE 1 TABLET BY MOUTH EVERY MORNING, 1/2 TABLET AT NOON AND 1 TABLET EVERY EVENING, WITH MEALS Disp: 225 tablet Rfl: 0   alprazolam 2 MG VOMITING  Levofloxacin            ANAPHYLAXIS, HIVES, ITCHING  Morphine                ANAPHYLAXIS, HIVES  Motrin [Ibuprofen]      ANAPHYLAXIS, HIVES  Norco [Hydrocodone-*    ANAPHYLAXIS, HIVES, OTHER (SEE                            COMMENTS)    Comment:Pt smoking and drinking to help minimize symptoms    2. Bilateral impacted cerumen  -bilateral cerumen mostly removed using irrigation of water and hydrogen peroxide. remaining cerumen removed with a currette. TMs visualized. Patient tolerated procedure well.

## 2018-11-12 ENCOUNTER — OFFICE VISIT (OUTPATIENT)
Dept: FAMILY MEDICINE CLINIC | Facility: CLINIC | Age: 43
End: 2018-11-12
Payer: MEDICARE

## 2018-11-12 VITALS
SYSTOLIC BLOOD PRESSURE: 112 MMHG | BODY MASS INDEX: 39 KG/M2 | DIASTOLIC BLOOD PRESSURE: 68 MMHG | HEART RATE: 80 BPM | OXYGEN SATURATION: 98 % | WEIGHT: 277 LBS

## 2018-11-12 DIAGNOSIS — H60.392 OTHER INFECTIVE ACUTE OTITIS EXTERNA OF LEFT EAR: Primary | ICD-10-CM

## 2018-11-12 PROCEDURE — 99214 OFFICE O/P EST MOD 30 MIN: CPT | Performed by: FAMILY MEDICINE

## 2018-11-12 RX ORDER — NEOMYCIN SULFATE, POLYMYXIN B SULFATE, HYDROCORTISONE 3.5; 10000; 1 MG/ML; [USP'U]/ML; MG/ML
3 SOLUTION/ DROPS AURICULAR (OTIC) 3 TIMES DAILY
Qty: 10 ML | Refills: 0 | Status: SHIPPED | OUTPATIENT
Start: 2018-11-12 | End: 2019-03-19

## 2018-11-12 RX ORDER — FLUTICASONE PROPIONATE 50 MCG
2 SPRAY, SUSPENSION (ML) NASAL DAILY
Qty: 1 BOTTLE | Refills: 2 | Status: SHIPPED | OUTPATIENT
Start: 2018-11-12 | End: 2019-02-09

## 2018-11-12 NOTE — PATIENT INSTRUCTIONS
Start ear drops in left ear 3-4 drops 3-4x/day for next 7 days  -- consider flonase nasal spray to help with sinus congestion  -- if can't tolerate, can use OTC saline nasal spray  -- followup in 1 wk if not improving

## 2018-11-12 NOTE — PROGRESS NOTES
CC:  Paulo Delatorre is a 44year old male here for Patient presents with:  Ear Pain: left ear x 5 days      HPI:     Left ear pain  -started last week after his ear cleaning  -associated with tenderness in left without without drainage or decreased hear mg total) by mouth nightly.  Disp: 90 tablet Rfl: 1   Insulin Syringe 30G X 5/16\" 1 ML Does not apply Misc INJ 1 SC BID WITH MEALS Disp:  Rfl: 3   Insulin Syringe-Needle U-100 (BD INSULIN SYRINGE ULTRAFINE) 30G X 1/2\" 1 ML Does not apply Misc Inject 1 eac HIVES  Reglan [Metoclopram*    HIVES, NAUSEA AND VOMITING  Tramadol                HIVES, ITCHING  Tylenol [Acetaminop*    HIVES, ITCHING    Family History   Problem Relation Age of Onset   • Diabetes Mother         DM   • Other (Other) Mother    • Heart D 5/6/2014    Performed by Rusty Vazquez MD at [facility] ENDOSCOPY   • ESOPHAGOGASTRODUODENOSCOPY (EGD) N/A 4/23/2018    Performed by Olivier Osullivan MD at [facility] ENDOSCOPY   • ESOPHAGOGASTRODUODENOSCOPY (EGD) N/A 5/20/2015    Performed by Rusty Vazquez MD at  [facility]  NECK: supple, no thyromegaly, no LAD  LUNGS: clear to auscultation bilateraly, no c/w/r  CARDIO: RRR without g/m/r  EXTREMITIES: no cyanosis, clubbing or edema    ASSESSMENT AND PLAN:     URI   - likely external otitis of left ear  -polymyxin-HC drops

## 2018-12-03 RX ORDER — TAMSULOSIN HYDROCHLORIDE 0.4 MG/1
CAPSULE ORAL
Qty: 180 CAPSULE | Refills: 0 | Status: SHIPPED | OUTPATIENT
Start: 2018-12-03 | End: 2019-02-25

## 2018-12-03 RX ORDER — SIMVASTATIN 40 MG
TABLET ORAL
Qty: 90 TABLET | Refills: 0 | OUTPATIENT
Start: 2018-12-03

## 2018-12-03 RX ORDER — SIMVASTATIN 40 MG
TABLET ORAL
Qty: 30 TABLET | Refills: 0 | Status: SHIPPED | OUTPATIENT
Start: 2018-12-03 | End: 2019-01-05

## 2018-12-03 NOTE — TELEPHONE ENCOUNTER
rxs approved  Patient past due to complete lipid panel and A1C  Copy of lab orders mailed to patient

## 2018-12-05 PROCEDURE — 82043 UR ALBUMIN QUANTITATIVE: CPT | Performed by: INTERNAL MEDICINE

## 2018-12-05 PROCEDURE — 82570 ASSAY OF URINE CREATININE: CPT | Performed by: INTERNAL MEDICINE

## 2019-01-06 RX ORDER — SIMVASTATIN 40 MG
TABLET ORAL
Qty: 90 TABLET | Refills: 0 | Status: SHIPPED | OUTPATIENT
Start: 2019-01-06 | End: 2019-02-25

## 2019-02-11 ENCOUNTER — APPOINTMENT (OUTPATIENT)
Dept: GENERAL RADIOLOGY | Age: 44
End: 2019-02-11
Attending: EMERGENCY MEDICINE
Payer: MEDICARE

## 2019-02-11 ENCOUNTER — HOSPITAL ENCOUNTER (EMERGENCY)
Age: 44
Discharge: HOME | End: 2019-02-11
Payer: MEDICARE

## 2019-02-11 VITALS
RESPIRATION RATE: 16 BRPM | OXYGEN SATURATION: 96 % | HEIGHT: 72 IN | HEART RATE: 86 BPM | WEIGHT: 270 LBS | SYSTOLIC BLOOD PRESSURE: 111 MMHG | DIASTOLIC BLOOD PRESSURE: 90 MMHG | BODY MASS INDEX: 36.57 KG/M2 | TEMPERATURE: 99 F

## 2019-02-11 DIAGNOSIS — B34.9 VIRAL SYNDROME: Primary | ICD-10-CM

## 2019-02-11 LAB
ALBUMIN SERPL-MCNC: 3.9 G/DL (ref 3.1–4.5)
ALBUMIN/GLOB SERPL: 1.3 {RATIO} (ref 1–2)
ALP LIVER SERPL-CCNC: 58 U/L (ref 45–117)
ALT SERPL-CCNC: 39 U/L (ref 17–63)
ANION GAP SERPL CALC-SCNC: 9 MMOL/L (ref 0–18)
AST SERPL-CCNC: 20 U/L (ref 15–41)
BASOPHILS # BLD AUTO: 0.07 X10(3) UL (ref 0–0.2)
BASOPHILS NFR BLD AUTO: 0.7 %
BILIRUB SERPL-MCNC: 0.4 MG/DL (ref 0.1–2)
BUN BLD-MCNC: 11 MG/DL (ref 8–20)
BUN/CREAT SERPL: 10.5 (ref 10–20)
CALCIUM BLD-MCNC: 8.8 MG/DL (ref 8.3–10.3)
CHLORIDE SERPL-SCNC: 105 MMOL/L (ref 101–111)
CO2 SERPL-SCNC: 25 MMOL/L (ref 22–32)
CREAT BLD-MCNC: 1.05 MG/DL (ref 0.7–1.3)
DEPRECATED RDW RBC AUTO: 43.1 FL (ref 35.1–46.3)
EOSINOPHIL # BLD AUTO: 0.07 X10(3) UL (ref 0–0.7)
EOSINOPHIL NFR BLD AUTO: 0.7 %
ERYTHROCYTE [DISTWIDTH] IN BLOOD BY AUTOMATED COUNT: 12.4 % (ref 11–15)
GLOBULIN PLAS-MCNC: 3 G/DL (ref 2.8–4.4)
GLUCOSE BLD-MCNC: 154 MG/DL (ref 70–99)
HCT VFR BLD AUTO: 44.1 % (ref 39–53)
HGB BLD-MCNC: 15.3 G/DL (ref 13–17.5)
IMM GRANULOCYTES # BLD AUTO: 0.04 X10(3) UL (ref 0–1)
IMM GRANULOCYTES NFR BLD: 0.4 %
LYMPHOCYTES # BLD AUTO: 1.96 X10(3) UL (ref 1–4)
LYMPHOCYTES NFR BLD AUTO: 19.7 %
M PROTEIN MFR SERPL ELPH: 6.9 G/DL (ref 6.4–8.2)
MCH RBC QN AUTO: 32.8 PG (ref 26–34)
MCHC RBC AUTO-ENTMCNC: 34.7 G/DL (ref 31–37)
MCV RBC AUTO: 94.4 FL (ref 80–100)
MONOCYTES # BLD AUTO: 0.95 X10(3) UL (ref 0.1–1)
MONOCYTES NFR BLD AUTO: 9.5 %
NEUTROPHILS # BLD AUTO: 6.87 X10 (3) UL (ref 1.5–7.7)
NEUTROPHILS # BLD AUTO: 6.87 X10(3) UL (ref 1.5–7.7)
NEUTROPHILS NFR BLD AUTO: 69 %
OSMOLALITY SERPL CALC.SUM OF ELEC: 290 MOSM/KG (ref 275–295)
PLATELET # BLD AUTO: 235 10(3)UL (ref 150–450)
POCT INFLUENZA A: NEGATIVE
POCT INFLUENZA B: NEGATIVE
POTASSIUM SERPL-SCNC: 4.4 MMOL/L (ref 3.6–5.1)
RBC # BLD AUTO: 4.67 X10(6)UL (ref 4.3–5.7)
SODIUM SERPL-SCNC: 139 MMOL/L (ref 136–144)
WBC # BLD AUTO: 10 X10(3) UL (ref 4–11)

## 2019-02-11 PROCEDURE — 85025 COMPLETE CBC W/AUTO DIFF WBC: CPT | Performed by: NURSE PRACTITIONER

## 2019-02-11 PROCEDURE — 87502 INFLUENZA DNA AMP PROBE: CPT | Performed by: NURSE PRACTITIONER

## 2019-02-11 PROCEDURE — 71046 X-RAY EXAM CHEST 2 VIEWS: CPT | Performed by: EMERGENCY MEDICINE

## 2019-02-11 PROCEDURE — 87081 CULTURE SCREEN ONLY: CPT | Performed by: EMERGENCY MEDICINE

## 2019-02-11 PROCEDURE — 96360 HYDRATION IV INFUSION INIT: CPT

## 2019-02-11 PROCEDURE — 99284 EMERGENCY DEPT VISIT MOD MDM: CPT

## 2019-02-11 PROCEDURE — 80053 COMPREHEN METABOLIC PANEL: CPT | Performed by: NURSE PRACTITIONER

## 2019-02-11 PROCEDURE — 87430 STREP A AG IA: CPT | Performed by: EMERGENCY MEDICINE

## 2019-02-11 RX ORDER — FLUTICASONE PROPIONATE 50 MCG
SPRAY, SUSPENSION (ML) NASAL
Qty: 1 BOTTLE | Refills: 2 | Status: SHIPPED | OUTPATIENT
Start: 2019-02-11 | End: 2019-03-19

## 2019-02-11 NOTE — ED PROVIDER NOTES
Patient Seen in: [facility] Emergency Department In [place]    History   Patient presents with:  Sore Throat  Cough/URI    Stated Complaint: SINUS CONGESTION/SORE THROAT/WEAK X FEW DAYS    52-year-old male presents today with complaints of generalized wea Unspecified sleep apnea     not using a device       Past Surgical History:   Procedure Laterality Date   • ANESTH,SURGERY SHOULDER BONE,OPEN     • APPENDECTOMY  2016   • APPENDECTOMY     • BRAVO ESOPHAGOGASTRODUODENOSCOPY N/A 3/29/2017    Performed by [facility]      Binge frequency: Weekly      Comment:  every other day, light beer 5-6    Drug use: No      Review of Systems    Positive for stated complaint: SINUS CONGESTION/SORE THROAT/WEAK X FEW DAYS  Other systems are as noted in HPI.   Constitutional and pilar -----------         ------                     CBC W/ DIFFERENTIAL[[id number]]                              Final result                 Please view results for these tests on the individual orders.    GRP A STREP CULT, THROAT   CBC W/ DIFFERENTIAL syndrome  (primary encounter diagnosis)    Disposition:  Discharge  2/11/2019 12:58 pm    Follow-up:  Z[address]  [phone]    In 1 week  As needed        Medications Prescribed:  Current Discharg

## 2019-02-25 ENCOUNTER — TELEPHONE (OUTPATIENT)
Dept: FAMILY MEDICINE CLINIC | Facility: CLINIC | Age: 44
End: 2019-02-25

## 2019-02-25 RX ORDER — TAMSULOSIN HYDROCHLORIDE 0.4 MG/1
CAPSULE ORAL
Qty: 180 CAPSULE | Refills: 0 | Status: SHIPPED | OUTPATIENT
Start: 2019-02-25 | End: 2019-05-08

## 2019-02-25 RX ORDER — SIMVASTATIN 40 MG
TABLET ORAL
Qty: 90 TABLET | Refills: 0 | Status: SHIPPED | OUTPATIENT
Start: 2019-02-25 | End: 2019-05-08

## 2019-02-25 NOTE — TELEPHONE ENCOUNTER
Pt needs a refill on Simvastatin 40mg 1 daily , Metformin 1000mg 2 1/2 per day and Tamsulosin 0.4 mg 2 at bedtime. He would like it sent to [facility] @ [phone] in [address]

## 2019-03-19 ENCOUNTER — OFFICE VISIT (OUTPATIENT)
Dept: FAMILY MEDICINE CLINIC | Facility: CLINIC | Age: 44
End: 2019-03-19
Payer: MEDICARE

## 2019-03-19 VITALS
TEMPERATURE: 99 F | HEART RATE: 70 BPM | WEIGHT: 268 LBS | HEIGHT: 70.75 IN | BODY MASS INDEX: 37.52 KG/M2 | SYSTOLIC BLOOD PRESSURE: 120 MMHG | OXYGEN SATURATION: 96 % | DIASTOLIC BLOOD PRESSURE: 70 MMHG

## 2019-03-19 DIAGNOSIS — K21.9 GASTROESOPHAGEAL REFLUX DISEASE, ESOPHAGITIS PRESENCE NOT SPECIFIED: ICD-10-CM

## 2019-03-19 DIAGNOSIS — I70.213 ATHEROSCLEROSIS OF NATIVE ARTERY OF BOTH LOWER EXTREMITIES WITH INTERMITTENT CLAUDICATION (HCC): ICD-10-CM

## 2019-03-19 DIAGNOSIS — J01.10 ACUTE FRONTAL SINUSITIS, RECURRENCE NOT SPECIFIED: Primary | ICD-10-CM

## 2019-03-19 PROCEDURE — 99215 OFFICE O/P EST HI 40 MIN: CPT | Performed by: FAMILY MEDICINE

## 2019-03-19 RX ORDER — TRIAMCINOLONE ACETONIDE 55 UG/1
2 SPRAY, METERED NASAL DAILY
Qty: 1 INHALER | Refills: 2 | Status: SHIPPED | OUTPATIENT
Start: 2019-03-19

## 2019-03-19 RX ORDER — AMOXICILLIN AND CLAVULANATE POTASSIUM 875; 125 MG/1; MG/1
1 TABLET, FILM COATED ORAL 2 TIMES DAILY
Qty: 14 TABLET | Refills: 0 | Status: SHIPPED | OUTPATIENT
Start: 2019-03-19 | End: 2019-05-08 | Stop reason: ALTCHOICE

## 2019-03-19 NOTE — PATIENT INSTRUCTIONS
Look for vascular surgeon near you    Start augmentin  Start nasocort  Continue mucinex as needed    Limit smoking and alcohol     Followup as needed      Kicking the Smoking Habit  If you smoke, quitting is one of the best changes you can make for your you may call them to chat when you have an urge to smoke. · If you’ve tried to quit before without success, this time avoid the triggers that may cause the relapse. · Make the most of slip-ups. Try to learn from them, and then get back on track.   · Be ac

## 2019-03-24 NOTE — PROGRESS NOTES
Garth Valdez is a 44year old male here for Patient presents with:  Sinus Problem: left earache x 1 week ago, Sore throat on and off x 1 month, light headed x 1 month. Stomach Pain: x 1 month with nausea and diarrhea. No vomiting. HPI:       1. • ANESTH,SURGERY SHOULDER BONE,OPEN     • APPENDECTOMY  2016   • APPENDECTOMY     • BRAVO ESOPHAGOGASTRODUODENOSCOPY N/A 3/29/2017    Performed by Richy Quinn DO at [facility] ENDOSCOPY   • CATH DRUG ELUTING STENT      left leg   • COLONOSCOPY  11/13    po Smokeless tobacco: Former User        Types: Snuff    Alcohol use: Yes      Frequency: 2-3 times a week      Drinks per session: 7 to 9      Binge frequency: Weekly      Comment:  every other day, light beer 5-8    Drug use: No       Medications (Active pr each into the skin 2 (two) times daily with meals.  Disp: 200 each Rfl: 3   CLONIDINE HCL 0.2 MG Oral Tab TAKE 1 TABLET(0.2 MG) BY MOUTH TWICE DAILY Disp: 180 tablet Rfl: 1   diazepam 2 MG Oral Tab Take 1 tablet (2 mg total) by mouth 3 (three) times daily a Denies  --CV: Denies  --GI: Denies  --: Denies  --MSK: Denies  --NEURO: Denies  --PSYCH: Denies  --HEME/LYMPH/IMMUN: Denies  --ENDO: Denies  --SKIN: Denies  All other systems reviewed are negative    PHYSICAL EXAM:   /70 (BP Location: Left arm, Judsonrupert Frye

## 2019-04-08 RX ORDER — SIMVASTATIN 40 MG
TABLET ORAL
Qty: 30 TABLET | Refills: 0 | Status: SHIPPED | OUTPATIENT
Start: 2019-04-08 | End: 2019-07-26

## 2019-05-08 ENCOUNTER — TELEPHONE (OUTPATIENT)
Dept: FAMILY MEDICINE CLINIC | Facility: CLINIC | Age: 44
End: 2019-05-08

## 2019-05-08 RX ORDER — TAMSULOSIN HYDROCHLORIDE 0.4 MG/1
CAPSULE ORAL
Qty: 180 CAPSULE | Refills: 0 | Status: SHIPPED | OUTPATIENT
Start: 2019-05-08 | End: 2019-08-10

## 2019-05-08 NOTE — TELEPHONE ENCOUNTER
While speaking to pt's Mom regarding something for her pt got on phone and asked for refills on his Nexium and flomax be sent to [email or web address] in [place].

## 2019-05-28 NOTE — TELEPHONE ENCOUNTER
Doc,    Do you manage his DM or does he see Dr. Mariaa Barnhart for this?   He has not been here for his DM and was not sure who he is seeing for this

## 2019-06-25 RX ORDER — SIMVASTATIN 40 MG
TABLET ORAL
Qty: 90 TABLET | Refills: 0 | OUTPATIENT
Start: 2019-06-25

## 2019-07-08 ENCOUNTER — TELEPHONE (OUTPATIENT)
Dept: FAMILY MEDICINE CLINIC | Facility: CLINIC | Age: 44
End: 2019-07-08

## 2019-07-26 DIAGNOSIS — E78.2 MIXED HYPERLIPIDEMIA: ICD-10-CM

## 2019-07-26 DIAGNOSIS — E11.65 UNCONTROLLED TYPE 2 DIABETES MELLITUS WITH HYPERGLYCEMIA, WITH LONG-TERM CURRENT USE OF INSULIN (HCC): ICD-10-CM

## 2019-07-26 DIAGNOSIS — Z79.4 UNCONTROLLED TYPE 2 DIABETES MELLITUS WITH HYPERGLYCEMIA, WITH LONG-TERM CURRENT USE OF INSULIN (HCC): ICD-10-CM

## 2019-07-26 DIAGNOSIS — E78.2 MIXED HYPERLIPIDEMIA: Primary | ICD-10-CM

## 2019-07-26 RX ORDER — SIMVASTATIN 40 MG
TABLET ORAL
Qty: 30 TABLET | Refills: 0 | Status: SHIPPED | OUTPATIENT
Start: 2019-07-26 | End: 2019-08-31

## 2019-07-26 RX ORDER — SIMVASTATIN 40 MG
TABLET ORAL
Qty: 90 TABLET | Refills: 0 | OUTPATIENT
Start: 2019-07-26

## 2019-07-26 NOTE — TELEPHONE ENCOUNTER
Pt requesting refill of metformin and simvastatin, refill approved, sent to pharmacy:     Last Time Medication was Filled:  5/28/19    Last Office Visit with PCP: 3/19/19 for acute visit.  Physical was 9/18/19 and patient asked to follow up in 3 months

## 2019-08-12 RX ORDER — TAMSULOSIN HYDROCHLORIDE 0.4 MG/1
CAPSULE ORAL
Qty: 180 CAPSULE | Refills: 0 | Status: SHIPPED | OUTPATIENT
Start: 2019-08-12 | End: 2019-10-26

## 2019-08-12 NOTE — TELEPHONE ENCOUNTER
Pt requesting refill of TAMSULOSIN HCL 0.4 MG Oral Cap, refill approved, sent to pharmacy:     Last Time Medication was Filled: 5/8/19    Last Office Visit with PCP: 3/19/19    No future appointments.

## 2019-08-31 DIAGNOSIS — E11.65 UNCONTROLLED TYPE 2 DIABETES MELLITUS WITH HYPERGLYCEMIA, WITH LONG-TERM CURRENT USE OF INSULIN (HCC): ICD-10-CM

## 2019-08-31 DIAGNOSIS — Z79.4 UNCONTROLLED TYPE 2 DIABETES MELLITUS WITH HYPERGLYCEMIA, WITH LONG-TERM CURRENT USE OF INSULIN (HCC): ICD-10-CM

## 2019-08-31 DIAGNOSIS — E78.2 MIXED HYPERLIPIDEMIA: ICD-10-CM

## 2019-09-03 RX ORDER — SIMVASTATIN 40 MG
TABLET ORAL
Qty: 30 TABLET | Refills: 0 | Status: SHIPPED | OUTPATIENT
Start: 2019-09-03

## 2019-09-03 NOTE — TELEPHONE ENCOUNTER
Pt requesting refill of Metformin and Simvastatin, refill approved, sent to pharmacy:       Last Office Visit with PCP: 3/19/19     No future appointments.

## 2019-10-23 ENCOUNTER — PATIENT OUTREACH (OUTPATIENT)
Dept: FAMILY MEDICINE CLINIC | Facility: CLINIC | Age: 44
End: 2019-10-23

## 2019-10-26 DIAGNOSIS — K21.9 GASTROESOPHAGEAL REFLUX DISEASE, ESOPHAGITIS PRESENCE NOT SPECIFIED: Primary | ICD-10-CM

## 2019-10-28 RX ORDER — TAMSULOSIN HYDROCHLORIDE 0.4 MG/1
CAPSULE ORAL
Qty: 180 CAPSULE | Refills: 0 | OUTPATIENT
Start: 2019-10-28

## 2019-10-28 RX ORDER — TAMSULOSIN HYDROCHLORIDE 0.4 MG/1
0.8 CAPSULE ORAL DAILY
Qty: 60 CAPSULE | Refills: 0 | Status: SHIPPED | OUTPATIENT
Start: 2019-10-28 | End: 2019-11-25

## 2019-10-28 NOTE — TELEPHONE ENCOUNTER
Pt requesting refill of Tamsulosin and Omeprazole, refill approved x1 month with message to schedule an appointment for further refills     Last Time Medication was Filled:  8/12/19    Last Office Visit with PCP: 3/19/19       No future appointments.

## 2019-11-25 DIAGNOSIS — K21.9 GASTROESOPHAGEAL REFLUX DISEASE, ESOPHAGITIS PRESENCE NOT SPECIFIED: ICD-10-CM

## 2019-11-25 RX ORDER — TAMSULOSIN HYDROCHLORIDE 0.4 MG/1
0.8 CAPSULE ORAL DAILY
Qty: 30 CAPSULE | Refills: 0 | Status: SHIPPED | OUTPATIENT
Start: 2019-11-25

## 2019-11-25 NOTE — TELEPHONE ENCOUNTER
Pt requesting refill of Tamsulosin and Nexium  refill approved, sent to pharmacy for 2 week supply with message to make appt     Last Time Medication was Filled:  10/28/19 with reminder to make appt    Last Office Visit with PCP: 3/19/19       No future

## 2020-01-13 RX ORDER — TAMSULOSIN HYDROCHLORIDE 0.4 MG/1
CAPSULE ORAL
Qty: 30 CAPSULE | Refills: 0 | OUTPATIENT
Start: 2020-01-13

## 2020-01-13 NOTE — TELEPHONE ENCOUNTER
Pt requesting refill of  tamsulosin [facility]) cap    Last Time Medication was Filled:  11/25/19    Last Office Visit with PCP: 3/19/19      No future appointments.

## 2021-10-26 NOTE — PATIENT INSTRUCTIONS
-- go to lab for cholesterol  -- try to increase exercise levels through the day - start by walking to mailbox 2-3x/day and increase from there   -- followup with surgeons for wound check and for left shoulder  -- followup with Dr. Jari Cooks as planned and (mg/dL)   Date Value   11/09/2016 111     TRIGLYCERIDES (mg/dL)   Date Value   08/06/2014 213 (H)     Triglycerides (mg/dL)   Date Value   11/09/2016 266 (H)        EKG - covered if needed at Welcome to Medicare, and non-screening if indicated for medical due to the size of the patient record, not all encounters were searched. Please check Results Review for a complete set of results.    Annually (age 48 or over), MEGAN not paid separately when covered E/M service is provided on same date    Immunizations benefits, but Medicare does not cover unless Medically needed    Zoster (Not covered by Medicare Part B) No results found. However, due to the size of the patient record, not all encounters were searched.  Please check Results Review for a complete set of r Mauc Instructions: By selecting yes to the question below the MAUC number will be added into the note.  This will be calculated automatically based on the diagnosis chosen, the size entered, the body zone selected (H,M,L) and the specific indications you chose. You will also have the option to override the Mohs AUC if you disagree with the automatically calculated number and this option is found in the Case Summary tab.

## 2021-12-17 NOTE — ED PROVIDER NOTES
Patient Seen in: THE [facility] Emergency Department In [place]    History   Patient presents with:  Sore Throat  Dyspnea BREANN SOB (respiratory)    Stated Complaint: sore throat, fever, breann    HPI    73-year-old type II diabetic male presents to emergency depa Comment left leg    UPPER GI ENDO NO BARRETTS  5/15    Comment food in stomach    VASCULAR SURGERY      OTHER SURGICAL HISTORY      Comment Head surgery/birthmark    OTHER SURGICAL HISTORY      Comment Knee/scope    OTHER SURGICAL HISTORY      Comment Neck Types: Snuff    Alcohol Use: Yes           0.0 oz/week       0 Standard drinks or equivalent per week       Comment: rare      Review of Systems    Positive for stated complaint: sore throat, fever, breann  Other systems are as noted in HPI.   Constitutional Glucose 186 (*)     All other components within normal limits   CBC W/ DIFFERENTIAL - Abnormal; Notable for the following:     Neutrophil Absolute Prelim 7.66 (*)     Neutrophil Absolute 7.66 (*)     Monocyte Absolute 1.10 (*)     All other components with with azithromycin. Follow-up with primary care tomorrow or early next week.         Disposition and Plan     Clinical Impression:  Pharyngitis, unspecified etiology  (primary encounter diagnosis)  Acute sinusitis, recurrence not specified, unspecified loca [Follow - Up] : a follow-up visit [Hyperthyroidism] : hyperthyroidism [Other___] : [unfilled]

## 2023-01-25 NOTE — ED PROVIDER NOTES
An examination that was significantly and separately identifiable from the procedure performed today was also completed for  PVD. Patient Seen in: [facility] In [place]    History   Patient presents with:  Pain (neurologic)    Stated Complaint: pain     HPI    52-year-old male presents to the emergency department complaining of severe pain in his neck, lower kristine complication, not stated as uncontrolled    • Unspecified essential hypertension    • Unspecified sleep apnea     not using a device       Past Surgical History:  No date: ANESTH,SURGERY SHOULDER BONE,OPEN  2016: APPENDECTOMY  No date: APPENDECTOMY  No bettina Ht 188 cm (6' 2\")   Wt 127 kg   SpO2 97%   BMI 35.95 kg/m²         Physical Exam    General appearance: This is a young adult male sitting in a gurney. He is groaning and grimacing. Vital signs were reviewed per nurse's notes.   HEENT: Normocephalic atra

## (undated) DEVICE — FILTERLINE NASAL ADULT O2/CO2

## (undated) DEVICE — Device: Brand: DEFENDO AIR/WATER/SUCTION AND BIOPSY VALVE

## (undated) DEVICE — ENDOSCOPY PACK UPPER: Brand: MEDLINE INDUSTRIES, INC.

## (undated) DEVICE — FORCEP BIOPSY RJ4 LG CAP W/ND

## (undated) DEVICE — 3M™ RED DOT™ MONITORING ELECTRODE WITH FOAM TAPE AND STICKY GEL, 50/BAG, 20/CASE, 72/PLT 2570: Brand: RED DOT™

## (undated) DEVICE — 1200CC GUARDIAN II: Brand: GUARDIAN

## (undated) NOTE — ED AVS SNAPSHOT
THE [facility] Emergency Department in [address]    Phone:  [phone]    Fax:  [address]   MRN: [id number]    Department:  THE [facility] Emergency Department in [place]   Date of Visi IF THERE IS ANY CHANGE OR WORSENING OF YOUR CONDITION, CALL YOUR PRIMARY CARE PHYSICIAN AT ONCE OR RETURN IMMEDIATELY TO THE EMERGENCY DEPARTMENT.     If you have been prescribed any medication(s), please fill your prescription right away and begin taking t

## (undated) NOTE — Clinical Note
2017    Patient: Tami Garcia  : 10/28/1975 Visit date: 2017    Dear  Dr. Edilberto Groves MD,    Thank you for referring Tami Garcia to my practice. Please find my assessment and plan below.            Assessment   Gastroesophageal reflux d

## (undated) NOTE — ED AVS SNAPSHOT
Nitish Loera   MRN: [id number]    Department:  [facility] Emergency Department in [place]   Date of Visit:  7/5/2018           Disclosure     Insurance plans vary and the physician(s) referred by the ER may not be covered by your plan.  Please conta tell this physician (or your personal doctor if your instructions are to return to your personal doctor) about any new or lasting problems. The primary care or specialist physician will see patients referred from the [facility] Emergency Department.  Francis Flores

## (undated) NOTE — ED AVS SNAPSHOT
Sharyle Arielle   MRN: [id number]    Department:  [facility] in [place]   Date of Visit:  2/11/2019           Disclosure     Insurance plans vary and the physician(s) referred by the ER may not be covered by your plan.  Please cont tell this physician (or your personal doctor if your instructions are to return to your personal doctor) about any new or lasting problems. The primary care or specialist physician will see patients referred from the [facility] Emergency Department.  Brandan Alford

## (undated) NOTE — ED AVS SNAPSHOT
THE [facility] Emergency Department in [address] [facility] [address]    Phone:  [phone]    Fax:  [address]   MRN: [id number]    Department:  THE [facility] Emergency Department in [place]   Date of Visi DEHYDRATION (ADULT) (ENGLISH)    VIRAL SYNDROME (ADULT) (ENGLISH)      Disclosure     Insurance plans vary and the physician(s) referred by the ER may not be covered by your plan.  Please contact your insurance company to determine coverage for follow-up c If you have been prescribed any medication(s), please fill your prescription right away and begin taking the medication(s) as directed    If the emergency physician has read X-rays, these will be re-interpreted by a radiologist.  If there is a significant can help with your Affordable Care Act coverage, as well as all types of Medicaid plans. To get signed up and covered, please call [phone] and ask to get set up for an insurance coverage that is in-network with [facility]ordan Cantu.         Kamaljit

## (undated) NOTE — MR AVS SNAPSHOT
[facility] [place]  [facility]  [phone]               Thank you for choosing us for your health care visit with Yon Ramirez MD.  We are glad to serve you and happy to provide you with this ball Central Scheduling at [phone].  Select option 1 for an [facility] location, option 2 for an [facility] location        Specifically to try FODMAP diet to help chronic abd pain    Assoc Dx:  Bloating [R14.0], Intractable abdominal pain [R10.9] Today's Vital Signs     BP Pulse Height Weight BMI    128/80 mmHg 102 72\" 265 lb 35.93 kg/m2         Current Medications          This list is accurate as of: 4/26/17 12:00 PM.  Always use your most recent med list.                Albuterol Sulfate HFA 10 TraZODone HCl 150 MG Tabs   TAKE 3 TABLETS(450 MG) BY MOUTH EVERY NIGHT   Commonly known as:  DESYREL                Where to Get Your Medications      These medications were sent to [facility] [facility] AT [address]

## (undated) NOTE — ED AVS SNAPSHOT
[facility] Emergency Department in [address] [facility]    Phone:  [phone]    Fax:  [address]   MRN: [id number]    Department:  [facility] Emergency Department in [place]   Date of Visi IF THERE IS ANY CHANGE OR WORSENING OF YOUR CONDITION, CALL YOUR PRIMARY CARE PHYSICIAN AT ONCE OR RETURN IMMEDIATELY TO THE EMERGENCY DEPARTMENT.     If you have been prescribed any medication(s), please fill your prescription right away and begin taking t

## (undated) NOTE — ED AVS SNAPSHOT
Clary Pool   MRN: [id number]    Department:  THE [facility] Emergency Department in [place]   Date of Visit:  11/7/2017           Disclosure     Insurance plans vary and the physician(s) referred by the ER may not be covered by your plan.  Please cont If you have been prescribed any medication(s), please fill your prescription right away and begin taking the medication(s) as directed    If the emergency physician has read X-rays, these will be re-interpreted by a radiologist.  If there is a significant

## (undated) NOTE — ED AVS SNAPSHOT
Jess Moreira   MRN: [id number]    Department:  THE [facility] Emergency Department in [place]   Date of Visit:  1/22/2018           Disclosure     Insurance plans vary and the physician(s) referred by the ER may not be covered by your plan.  Please cont tell this physician (or your personal doctor if your instructions are to return to your personal doctor) about any new or lasting problems. The primary care or specialist physician will see patients referred from the [facility] Emergency Department.  Silvano Fisher

## (undated) NOTE — MR AVS SNAPSHOT
[facility] [address]  [phone]               Thank you for choosing us for your health care visit with Jagdeep Ball MD.  We are glad to serve you and happy to provide you with this ball 144/90 mmHg 88 97.5 °F (36.4 °C) (Oral) 71\" 268 lb 37.39 kg/m2         Current Medications          This list is accurate as of: 3/7/17  3:35 PM.  Always use your most recent med list.                Albuterol Sulfate  (90 Base) MCG/ACT Aers   Inh

## (undated) NOTE — MR AVS SNAPSHOT
[facility] Rosy  Gurpreet [facility]auffman [facility]  [phone]               Thank you for choosing us for your health care visit with Denver Grubbs MD.  We are glad to serve you and happy to provide you with this ball Albuterol Sulfate  (90 BASE) MCG/ACT Aers   Inhale 2 puffs into the lungs every 6 (six) hours as needed for Wheezing.            alprazolam 2 MG Tabs   TAKE 1 TABLET BY MOUTH THREE TIMES DAILY AS NEEDED   Commonly known as:  XANAX           azithrom

## (undated) NOTE — ED AVS SNAPSHOT
THE [facility] Emergency Department in [address] [facility]    Phone:  [phone]    Fax:  [address]   MRN: [id number]    Department:  THE [facility] Emergency Department in [place]   Date of Visi IF THERE IS ANY CHANGE OR WORSENING OF YOUR CONDITION, CALL YOUR PRIMARY CARE PHYSICIAN AT ONCE OR RETURN IMMEDIATELY TO THE EMERGENCY DEPARTMENT.     If you have been prescribed any medication(s), please fill your prescription right away and begin taking t

## (undated) NOTE — LETTER
[facility]  Phone: [phone]  Right Fax: [phone]  Vishnu 20 Berrtam Justice RN Date: 3/21/17    Patient Name: Hanh Has  Surgery Date: 3/29/2017    CSN: [id number]  Medical Record: [id number]   DO

## (undated) NOTE — LETTER
[facility]  Jose Church 62 [address]    Consent for Operation    Date: __________________    Time: _______________    1.  I authorize the performance upon Trixie Griffin the following operation:    Procedure(s):  ESOPHAGOGASTRODUOD videotape. The [facility] will not be responsible for storage or maintenance of this tape. 6. For the purpose of advancing medical education, I consent to the admittance of observers to the Operating Room.     7. I authorize the use of any specimen, organs Signature of Patient:   ___________________________    When the patient is a minor or mentally incompetent to give consent:  Signature of person authorized to consent for patient: ___________________________   Relationship to patient: _____________________ drugs/illegal medications). Failure to inform my anesthesiologist about these medicines may increase my risk of anesthetic complications. · If I am allergic to anything or have had a reaction to anesthesia before.     3. I understand how the anesthesia med I have discussed the procedure and information above with the patient (or patient’s representative) and answered their questions. The patient or their representative has agreed to have anesthesia services.     _______________________________________________

## (undated) NOTE — MR AVS SNAPSHOT
[facility] [place]y  Gurpreet [facility]auffman [facility]  [phone]               Thank you for choosing us for your health care visit with Cherri Sheffield MD.  We are glad to serve you and happy to provide you with this ball 120/82 mmHg 75 96.7 °F (35.9 °C) (Oral) 71\" 270 lb 37.67 kg/m2         Current Medications          This list is accurate as of: 2/20/17 10:01 AM.  Always use your most recent med list.                Albuterol Sulfate  (90 Base) MCG/ACT Aers   In Visit [facility] online at  [email or web address]

## (undated) NOTE — MR AVS SNAPSHOT
[facility]  [phone]               Thank you for choosing us for your health care visit with Chelo Jenkins MD.  We are glad to serve you and happy to provide you with this ball This list is accurate as of: 1/30/17  2:31 PM.  Always use your most recent med list.                Albuterol Sulfate  (90 Base) MCG/ACT Aers   Inhale 2 puffs into the lungs every 6 (six) hours as needed for Wheezing.            alprazolam 2 MG Tabs

## (undated) NOTE — Clinical Note
2017    Patient: Ligia Paez  : 10/28/1975 Visit date: 2017    Dear  Dr. Edilia Sexton MD,    Thank you for referring Ligia Paez to my practice. Please find my assessment and plan below.         Assessment   Gastroesophageal reflux dise

## (undated) NOTE — ED AVS SNAPSHOT
THE [facility] Emergency Department in [facility] [address]  Phone:  [phone]  Fax:  [phone]   MRN: [id number]    Department:  THE [facility] Emergency Department in [place]   Date of Visit:  7/7/20 IF THERE IS ANY CHANGE OR WORSENING OF YOUR CONDITION, CALL YOUR PRIMARY CARE PHYSICIAN AT ONCE OR RETURN IMMEDIATELY TO THE EMERGENCY DEPARTMENT.     If you have been prescribed any medication(s), please fill your prescription right away and begin taking t

## (undated) NOTE — Clinical Note
03/15/2017    Patient: Wendy Galeano  : 10/28/1975 Visit date: 3/15/2017    Dear  Dr. Ryne Jerome MD,    Thank you for referring Wendy Galeano to my practice. Please find my assessment and plan below.            Assessment   Gastroesophageal reflux d

## (undated) NOTE — MR AVS SNAPSHOT
[facility]  [phone]               Thank you for choosing us for your health care visit with Chelo Jenkins MD.  We are glad to serve you and happy to provide you with this ball This list is accurate as of: 1/17/17 11:27 AM.  Always use your most recent med list.                Albuterol Sulfate  (90 BASE) MCG/ACT Aers   Inhale 2 puffs into the lungs every 6 (six) hours as needed for Wheezing.            alprazolam 2 MG Tabs

## (undated) NOTE — ED AVS SNAPSHOT
[facility] Emergency Department    [place]  [address] [address]    Phone:  [phone]    Fax:  Mary Borden   MRN: [id number]    Department:  [facility] Emergency Department   Date of Visit:  4/ IF THERE IS ANY CHANGE OR WORSENING OF YOUR CONDITION, CALL YOUR PRIMARY CARE PHYSICIAN AT ONCE OR RETURN IMMEDIATELY TO THE EMERGENCY DEPARTMENT.     If you have been prescribed any medication(s), please fill your prescription right away and begin taking t

## (undated) NOTE — ED AVS SNAPSHOT
THE [facility] Emergency Department in [address] [facility]    Phone:  [phone]    Fax:  [address]   MRN: [id number]    Department:  THE [facility] Emergency Department in [place]   Date of Visi Follow the directions for taking your medications provided by your doctor. Please ask your health care provider, pharmacist or nurse if you have any questions regarding your home medications, including potential side effects.               Medication List receive this, we would really appreciate it if you could take the time to complete it. Thank you! You were examined and treated today on an urgent basis only. This was not a substitute for ongoing medical care.  Often, one Emergency Department visit d 4455  [facility] ([address]) [facility] [address] ([address]) [address]  [place] ([address]) [address]  [address] ([address]) 595 caliber. Mediastinal contours are smooth. Stable chest x-ray. E[facility]art     Call the [employer] for assistance with your inactive [employer] account    If you have questions, you can call [phone] to talk to our [facility] Staff.  Remember,

## (undated) NOTE — LETTER
4/25/2018          Flor 52    Dear Meliza Goldberg,       Here are the biopsy/pathology findings from your recent EGD (Upper  Endoscopy):    a normal biopsy of the stomach, small intestines and esophagus with no e

## (undated) NOTE — ED AVS SNAPSHOT
[facility] Emergency Department    [place]  [address] [address]    Phone:  [phone]    Fax:  Mary Borden   MRN: [id number]    Department:  [facility] Emergency Department   Date of Visit:  4/ Lancets Misc   Quantity:  100 each   Inject 1 each into the skin 2 (two) times daily.             Where to Get Your Medications      You can get these medications from any pharmacy     Bring a paper prescription for each of these medications    - Aldo tell this physician (or your personal doctor if your instructions are to return to your personal doctor) about any new or lasting problems. The primary care or specialist physician will see patients referred from the [facility] Emergency Department.  Aftab Tai - If you are a smoker or have smoked in the last 12 months, we encourage you to explore options for quitting.     - If you have concerns related to behavioral health issues or thoughts of harming yourself, contact [facility] a ADRENALS:  No mass or enlargement. LIVER:  No enlargement, atrophy, abnormal density, or significant focal lesion. BILIARY:  Gallbladder is contracted. PANCREAS:  No lesion, fluid collection, ductal dilatation, or atrophy.     SPLEEN:  No enlargeme

## (undated) NOTE — ED AVS SNAPSHOT
Venkat Lyons   MRN: [id number]    Department:  [facility] in [place]   Date of Visit:  7/12/2018           Disclosure     Insurance plans vary and the physician(s) referred by the ER may not be covered by your plan.  Please cont tell this physician (or your personal doctor if your instructions are to return to your personal doctor) about any new or lasting problems. The primary care or specialist physician will see patients referred from the [facility] Emergency Department.  Margaret Laws

## (undated) NOTE — LETTER
05/14/18        Flor 52      Dear Lucy Romo records indicate that you have outstanding lab work and or testing that was ordered for you and has not yet been completed:          Hemoglobin A1C [E]

## (undated) NOTE — LETTER
17    Patient: Ruthann Dunlap  : 10/28/1975 Visit date: 2017    Dear  Dr. Mariely Beasley MD,    Thank you for referring Ruthann Dunlap to my practice. Please find my assessment and plan below.                  Assessment   Lipoma of abdominal w

## (undated) NOTE — ED AVS SNAPSHOT
THE [facility] Emergency Department in [address] [facility]    Phone:  [phone]    Fax:  [address]   MRN: [id number]    Department:  THE [facility] Emergency Department in [place]   Date of Visi IF THERE IS ANY CHANGE OR WORSENING OF YOUR CONDITION, CALL YOUR PRIMARY CARE PHYSICIAN AT ONCE OR RETURN IMMEDIATELY TO THE EMERGENCY DEPARTMENT.     If you have been prescribed any medication(s), please fill your prescription right away and begin taking t

## (undated) NOTE — MR AVS SNAPSHOT
[facility]  [phone]               Thank you for choosing us for your health care visit with Tiarra Chu MD.  We are glad to serve you and happy to provide you with this ball Norco [Hydrocodone-Acetaminophen] Anaphylaxis, Hives, Other (See Comments)    Pt states \"messes with depression\"    Ondansetron Anaphylaxis, Hives    Other Hives, Itching, Nausea and vomiting    Probiotic ZXL 3    Prilosec [Omeprazole] Hives, Itching, N Commonly known as:  GLUCOPHAGE           simvastatin 40 MG Tabs   TAKE 1 TABLET BY MOUTH NIGHTLY   What changed:    - how much to take  - how to take this  - when to take this  - additional instructions   Commonly known as:  ZOCOR           tamsulosin HCl

## (undated) NOTE — IP AVS SNAPSHOT
[facility] [place] [address] [place], [address] ~ [phone]                Discharge Summary   3/29/2017    Loc Worrell           Admission Information        Provider Department    3/29/2017 Chris Todd DO  Endoscopy Commonly known as:  BD PEN NEEDLE SHORT U/F        USE AS DIRECTED ONCE DAILY    Dustin Pears     [    ]    [    ]    [    ]    [    ]       Lancets Misc        Inject 1 each into the skin 2 (two) times daily.     Nathan Fenphilippe     [    ]    [    ]    [ [    ]    [    ]       TraZODone HCl 150 MG Tabs   Commonly known as:  DESYREL        TAKE 3 TABLETS(450 MG) BY MOUTH EVERY NIGHT    Nadeen Pak     [    ]    [    ]    [    ]    [    ]                 Patient Instructions       Home Care Instructions - We recommend that you keep the  with you at all times. - If you take a shower, keep it outside the shower stall. DO NOT GET THE  WET. - Record events (i.e. Meals and sleep) in the patient diary using the time on the 's display. 61.1 (02/04/17)  26.5 (02/04/17)  9.9 (02/04/17)  1.0 (02/04/17)  0.8 -- (02/04/17)  5.27 (02/04/17)  2.28 (02/04/17)  0.85 (H) (02/04/17)  0.09 (02/04/17)  0.07    (01/15/17)  66.6 (01/15/17)  20.2 (01/15/17)  11.3 (01/15/17)  0.5 (01/15/17)  0.8  (01/15/ MyChart

## (undated) NOTE — ED AVS SNAPSHOT
Sarah Tovar   MRN: [id number]    Department:  THE [facility] Emergency Department in [place]   Date of Visit:  3/29/2018           Disclosure     Insurance plans vary and the physician(s) referred by the ER may not be covered by your plan.  Please cont tell this physician (or your personal doctor if your instructions are to return to your personal doctor) about any new or lasting problems. The primary care or specialist physician will see patients referred from the [facility] Emergency Department.  Nikolas Pedraza

## (undated) NOTE — ED AVS SNAPSHOT
THE [facility] Emergency Department in [address] [facility]    Phone:  [phone]    Fax:  [address]   MRN: [id number]    Department:  THE [facility] Emergency Department in [place]   Date of Visi PLEASE STOP SMOKING  RETURN TO THE ED IF ANY OTHER PROBLEMS ARISE    Discharge References/Attachments     CONTUSION, SHOULDER (ENGLISH)    PHARYNGITIS, VIRAL (ENGLISH)    URI, VIRAL, NO ABX (ADULT) (ENGLISH)      Disclosure     Insurance plans vary and the IF THERE IS ANY CHANGE OR WORSENING OF YOUR CONDITION, CALL YOUR PRIMARY CARE PHYSICIAN AT ONCE OR RETURN IMMEDIATELY TO THE EMERGENCY DEPARTMENT.     If you have been prescribed any medication(s), please fill your prescription right away and begin taking t Patient [address] to help you get signed up for insurance coverage. Patient [address] is a Federal Navigator program that can help with your Affordable Care Act coverage, as well as all types of Medicaid plans.   To get signed up and covere dizziness and landed on left side. Patient has pain to anterior shoulder that radiates down left arm. Patient has difficulty moving arm for imaging due to pain and was unable to abduct   arm. History of left shoulder surgery 2016.          Findings:  No

## (undated) NOTE — ED AVS SNAPSHOT
THE [facility] Emergency Department in [address] [facility]    Phone:  [phone]    Fax:  [address]   MRN: [id number]    Department:  THE [facility] Emergency Department in [place]   Date of Visi Follow-up with your GI specialist as previously directed. Disclosure     Insurance plans vary and the physician(s) referred by the ER may not be covered by your plan.  Please contact your insurance company to determine coverage for follow-up care and ref prescription right away and begin taking the medication(s) as directed    If the emergency physician has read X-rays, these will be re-interpreted by a radiologist.  If there is a significant change in your reading, you will be contacted.  Please make sure Medicaid plans. To get signed up and covered, please call [phone] and ask to get set up for an insurance coverage that is in-network with [facility]

## (undated) NOTE — LETTER
17    Patient: Trixie Griffin  : 10/28/1975 Visit date: 2017    Dear  Dr. Ellis Ruiz MD,    Thank you for referring Trixie Griffin to my practice. Please find my assessment and plan below.                Assessment   Pre-op testing  (primar

## (undated) NOTE — ED AVS SNAPSHOT
Patriciamike Juan Ramon   MRN: [id number]    Department:  [address] Emergency Department in [place]   Date of Visit:  5/27/2018           Disclosure     Insurance plans vary and the physician(s) referred by the ER may not be covered by your plan.  Please cont tell this physician (or your personal doctor if your instructions are to return to your personal doctor) about any new or lasting problems. The primary care or specialist physician will see patients referred from the [facility] Emergency Department.  Ricardo Ledesma

## (undated) NOTE — ED AVS SNAPSHOT
THE [facility] Emergency Department in [address] [facility]  Phone:  [phone]  Fax:  [address]   MRN: [id number]    Department:  THE [facility] Emergency Department in [place]   Date of Visit:  6/25/2 IF THERE IS ANY CHANGE OR WORSENING OF YOUR CONDITION, CALL YOUR PRIMARY CARE PHYSICIAN AT ONCE OR RETURN IMMEDIATELY TO THE EMERGENCY DEPARTMENT.     If you have been prescribed any medication(s), please fill your prescription right away and begin taking t